# Patient Record
Sex: FEMALE | Race: WHITE | NOT HISPANIC OR LATINO | Employment: UNEMPLOYED | URBAN - METROPOLITAN AREA
[De-identification: names, ages, dates, MRNs, and addresses within clinical notes are randomized per-mention and may not be internally consistent; named-entity substitution may affect disease eponyms.]

---

## 2019-05-13 ENCOUNTER — HOSPITAL ENCOUNTER (EMERGENCY)
Facility: HOSPITAL | Age: 44
Discharge: HOME | End: 2019-05-13
Attending: EMERGENCY MEDICINE
Payer: COMMERCIAL

## 2019-05-13 VITALS
RESPIRATION RATE: 18 BRPM | TEMPERATURE: 98.7 F | HEART RATE: 60 BPM | OXYGEN SATURATION: 100 % | SYSTOLIC BLOOD PRESSURE: 148 MMHG | WEIGHT: 165 LBS | DIASTOLIC BLOOD PRESSURE: 57 MMHG

## 2019-05-13 DIAGNOSIS — H81.399 PERIPHERAL VERTIGO: Primary | ICD-10-CM

## 2019-05-13 PROCEDURE — 99283 EMERGENCY DEPT VISIT LOW MDM: CPT | Performed by: EMERGENCY MEDICINE

## 2019-05-13 PROCEDURE — 93005 ELECTROCARDIOGRAM TRACING: CPT

## 2019-05-13 PROCEDURE — 99284 EMERGENCY DEPT VISIT MOD MDM: CPT

## 2019-05-13 RX ORDER — MECLIZINE HCL 12.5 MG/1
25 TABLET ORAL ONCE
Status: COMPLETED | OUTPATIENT
Start: 2019-05-13 | End: 2019-05-13

## 2019-05-13 RX ORDER — MECLIZINE HYDROCHLORIDE 25 MG/1
25 TABLET ORAL EVERY 8 HOURS PRN
Qty: 30 TABLET | Refills: 0 | Status: SHIPPED | OUTPATIENT
Start: 2019-05-13

## 2019-05-13 RX ADMIN — MECLIZINE 25 MG: 12.5 TABLET ORAL at 17:40

## 2019-05-14 LAB
ATRIAL RATE: 0 BPM
ATRIAL RATE: 0 BPM
ATRIAL RATE: 60 BPM
P AXIS: 8 DEGREES
PR INTERVAL: 116 MS
QRS AXIS: 0 DEGREES
QRS AXIS: 0 DEGREES
QRS AXIS: 34 DEGREES
QRSD INTERVAL: 0 MS
QRSD INTERVAL: 0 MS
QRSD INTERVAL: 86 MS
QT INTERVAL: 0 MS
QT INTERVAL: 0 MS
QT INTERVAL: 416 MS
QTC INTERVAL: 0 MS
QTC INTERVAL: 0 MS
QTC INTERVAL: 416 MS
T WAVE AXIS: 0 DEGREES
T WAVE AXIS: 0 DEGREES
T WAVE AXIS: 35 DEGREES
VENTRICULAR RATE: 0 BPM
VENTRICULAR RATE: 0 BPM
VENTRICULAR RATE: 60 BPM

## 2019-05-14 PROCEDURE — 93010 ELECTROCARDIOGRAM REPORT: CPT | Performed by: INTERNAL MEDICINE

## 2019-12-15 ENCOUNTER — APPOINTMENT (EMERGENCY)
Dept: RADIOLOGY | Facility: HOSPITAL | Age: 44
End: 2019-12-15
Payer: COMMERCIAL

## 2019-12-15 ENCOUNTER — HOSPITAL ENCOUNTER (EMERGENCY)
Facility: HOSPITAL | Age: 44
Discharge: HOME | End: 2019-12-15
Attending: EMERGENCY MEDICINE
Payer: COMMERCIAL

## 2019-12-15 VITALS
RESPIRATION RATE: 18 BRPM | WEIGHT: 165 LBS | TEMPERATURE: 98 F | OXYGEN SATURATION: 99 % | HEART RATE: 72 BPM | DIASTOLIC BLOOD PRESSURE: 84 MMHG | SYSTOLIC BLOOD PRESSURE: 113 MMHG

## 2019-12-15 DIAGNOSIS — J02.9 PHARYNGITIS: Primary | ICD-10-CM

## 2019-12-15 PROCEDURE — 70360 X-RAY EXAM OF NECK: CPT

## 2019-12-15 PROCEDURE — 99283 EMERGENCY DEPT VISIT LOW MDM: CPT

## 2019-12-15 PROCEDURE — 99283 EMERGENCY DEPT VISIT LOW MDM: CPT | Performed by: EMERGENCY MEDICINE

## 2019-12-15 RX ADMIN — DEXAMETHASONE SODIUM PHOSPHATE 10 MG: 10 INJECTION, SOLUTION INTRAMUSCULAR; INTRAVENOUS at 13:24

## 2019-12-15 NOTE — ED PROVIDER NOTES
History  Chief Complaint   Patient presents with    Pain     pt has pain in throat X4 days, history of thyroid nodules     This is a 53 y o  old female who presents to the ED for evaluation of sore throat  Has 2 thyroid cysts  Has had 4 days of pain with swallowing, no fish  Has a sensation like something is stuck  No cough, congestion runny nose  Has not taken any medications to help  This its related to the thyroid  Otherwise, patient denies fevers, chills, night sweats, cough, congestion, rhinorrhea, CP, dyspnea, abdominal pain, nausea, vomiting, diarrhea, constipation, urinary symptoms, leg pain or swelling  Prior to Admission Medications   Prescriptions Last Dose Informant Patient Reported? Taking?   meclizine (ANTIVERT) 25 mg tablet   No No   Sig: Take 1 tablet (25 mg total) by mouth every 8 (eight) hours as needed for dizziness      Facility-Administered Medications: None     Past Medical History:   Diagnosis Date    Disease of thyroid gland      History reviewed  No pertinent surgical history  History reviewed  No pertinent family history  I have reviewed and agree with the history as documented  Social History     Tobacco Use    Smoking status: Never Smoker    Smokeless tobacco: Never Used   Substance Use Topics    Alcohol use: Never     Frequency: Never    Drug use: Never      Review of Systems   Constitutional: Negative for chills, fatigue, fever and unexpected weight change  HENT: Negative for congestion, rhinorrhea and sore throat  Eyes: Negative for redness and visual disturbance  Respiratory: Positive for shortness of breath  Negative for cough  Cardiovascular: Negative for chest pain and leg swelling  Gastrointestinal: Negative for abdominal pain, constipation, diarrhea, nausea and vomiting  Endocrine: Negative for cold intolerance and heat intolerance  Genitourinary: Negative for dysuria, frequency and urgency  Musculoskeletal: Negative for back pain     Skin: Negative for rash  Neurological: Negative for dizziness, syncope and numbness  All other systems reviewed and are negative  Physical Exam  Physical Exam   Constitutional: She is oriented to person, place, and time  She appears well-developed and well-nourished  No distress  HENT:   Head: Normocephalic and atraumatic  Nose: Nose normal    Mouth/Throat: Uvula is midline and mucous membranes are normal  No oropharyngeal exudate, posterior oropharyngeal edema or posterior oropharyngeal erythema  Eyes: Pupils are equal, round, and reactive to light  Conjunctivae and EOM are normal    Neck: Normal range of motion  Neck supple  Tightness in anterior neck with ROM  Cardiovascular: Normal rate, regular rhythm and normal heart sounds  Exam reveals no gallop  No murmur heard  Pulmonary/Chest: Effort normal and breath sounds normal  She has no wheezes  She exhibits no tenderness  Abdominal: Soft  Bowel sounds are normal  She exhibits no distension  There is no tenderness  There is no rebound and no guarding  Musculoskeletal: Normal range of motion  She exhibits no tenderness or deformity  Lymphadenopathy:        Head (right side): No submental, no submandibular, no tonsillar and no occipital adenopathy present  Head (left side): No submental, no submandibular, no tonsillar and no occipital adenopathy present  She has no cervical adenopathy  Right cervical: No superficial cervical, no deep cervical and no posterior cervical adenopathy present  Left cervical: No superficial cervical, no deep cervical and no posterior cervical adenopathy present  Neurological: She is alert and oriented to person, place, and time  No cranial nerve deficit  Skin: Skin is warm and dry  No rash noted  She is not diaphoretic  No erythema  Psychiatric: She has a normal mood and affect  Nursing note and vitals reviewed      Vital Signs  ED Triage Vitals [12/15/19 1223]   Temperature Pulse Respirations Blood Pressure SpO2   98 °F (36 7 °C) 72 18 113/84 99 %      Temp Source Heart Rate Source Patient Position - Orthostatic VS BP Location FiO2 (%)   Oral -- -- Left arm --      Pain Score       8         ED Medications  Medications   dexamethasone 10 mg/mL oral liquid 10 mg 1 mL (10 mg Oral Given 12/15/19 1324)     Diagnostic Studies  Results Reviewed     None         XR neck soft tissue   ED Interpretation by Jasvir Hansen MD (12/15 1336)   No evidence of acute abnormality, as interpreted by me         Procedures  Procedures     ED Course       A/P: This is a 53 y o  female who presents to the ED for evaluation of sore throat  No celar etiology on exam  Soft tissue neck xray  Dec[facility]  [address] [facility] negative  Suspect pharyngitis  Symptomatic management  I personally discussed return precautions with this patient and family  I provided the patient with written discharge instructions and particularly highlighted specific areas of interest to this patient, including but not limited to: medications for symptom managment, follow up recommendations, and return precautions  Patient and family are in agreement with this plan as outlined above  MDM    Disposition  Final diagnoses:   Pharyngitis     Time reflects when diagnosis was documented in both MDM as applicable and the Disposition within this note     Time User Action Codes Description Comment    12/15/2019  1:43 PM Chikis Bautista Add [J02 9] Pharyngitis       ED Disposition     ED Disposition Condition Date/Time Comment    Discharge Stable Sun Dec 15, 2019  1:43 PM Kristina Knig discharge to home/self care              Follow-up Information     Follow up With Specialties Details Why Contact Info Additional [id number] Internal Medicine Call in 3 days For reevaluation as needed [address] [facility]  [phone]       [facility] Emergency Medicine Go to  If symptoms worsen [address] [facility] [address] [id number]  [phone] AN ED, [address], [place], [place], [address]          Discharge Medication List as of 12/15/2019  1:55 PM      CONTINUE these medications which have NOT CHANGED    Details   meclizine (ANTIVERT) 25 mg tablet Take 1 tablet (25 mg total) by mouth every 8 (eight) hours as needed for dizziness, Starting Mon 5/13/2019, Print           No discharge procedures on file      ED Provider  Electronically Signed by           Tricia Mayorga MD  12/15/19 0687

## 2021-04-23 ENCOUNTER — IMMUNIZATIONS (OUTPATIENT)
Dept: FAMILY MEDICINE CLINIC | Facility: HOSPITAL | Age: 46
End: 2021-04-23

## 2021-04-23 DIAGNOSIS — Z23 ENCOUNTER FOR IMMUNIZATION: Primary | ICD-10-CM

## 2021-04-23 PROCEDURE — 91300 SARS-COV-2 / COVID-19 MRNA VACCINE (PFIZER-BIONTECH) 30 MCG: CPT

## 2021-04-23 PROCEDURE — 0001A SARS-COV-2 / COVID-19 MRNA VACCINE (PFIZER-BIONTECH) 30 MCG: CPT

## 2021-05-15 ENCOUNTER — IMMUNIZATIONS (OUTPATIENT)
Dept: FAMILY MEDICINE CLINIC | Facility: HOSPITAL | Age: 46
End: 2021-05-15

## 2021-05-15 DIAGNOSIS — Z23 ENCOUNTER FOR IMMUNIZATION: Primary | ICD-10-CM

## 2021-05-15 PROCEDURE — 0002A SARS-COV-2 / COVID-19 MRNA VACCINE (PFIZER-BIONTECH) 30 MCG: CPT

## 2021-05-15 PROCEDURE — 91300 SARS-COV-2 / COVID-19 MRNA VACCINE (PFIZER-BIONTECH) 30 MCG: CPT

## 2021-10-27 ENCOUNTER — HOSPITAL ENCOUNTER (OUTPATIENT)
Dept: ULTRASOUND IMAGING | Facility: HOSPITAL | Age: 46
Discharge: HOME | End: 2021-10-27
Payer: COMMERCIAL

## 2021-10-27 DIAGNOSIS — E04.1 THYROID CYST: ICD-10-CM

## 2021-10-27 PROCEDURE — 76536 US EXAM OF HEAD AND NECK: CPT

## 2021-12-08 ENCOUNTER — HOSPITAL ENCOUNTER (EMERGENCY)
Facility: HOSPITAL | Age: 46
Discharge: HOME | End: 2021-12-09
Attending: EMERGENCY MEDICINE | Admitting: EMERGENCY MEDICINE
Payer: COMMERCIAL

## 2021-12-08 ENCOUNTER — APPOINTMENT (EMERGENCY)
Dept: RADIOLOGY | Facility: HOSPITAL | Age: 46
End: 2021-12-08
Payer: COMMERCIAL

## 2021-12-08 VITALS
SYSTOLIC BLOOD PRESSURE: 173 MMHG | OXYGEN SATURATION: 100 % | RESPIRATION RATE: 18 BRPM | HEART RATE: 64 BPM | WEIGHT: 165 LBS | TEMPERATURE: 98.1 F | DIASTOLIC BLOOD PRESSURE: 91 MMHG

## 2021-12-08 DIAGNOSIS — N39.0 UTI (URINARY TRACT INFECTION): Primary | ICD-10-CM

## 2021-12-08 DIAGNOSIS — F41.9 ANXIETY: ICD-10-CM

## 2021-12-08 DIAGNOSIS — E05.90 HYPERTHYROIDISM: ICD-10-CM

## 2021-12-08 LAB
ALBUMIN SERPL BCP-MCNC: 3.5 G/DL (ref 3.5–5)
ALP SERPL-CCNC: 82 U/L (ref 46–116)
ALT SERPL W P-5'-P-CCNC: 31 U/L (ref 12–78)
ANION GAP SERPL CALCULATED.3IONS-SCNC: 8 MMOL/L (ref 4–13)
AST SERPL W P-5'-P-CCNC: 17 U/L (ref 5–45)
ATRIAL RATE: 71 BPM
BACTERIA UR QL AUTO: ABNORMAL /HPF
BASOPHILS # BLD AUTO: 0.03 THOUSANDS/ΜL (ref 0–0.1)
BASOPHILS NFR BLD AUTO: 0 % (ref 0–1)
BILIRUB SERPL-MCNC: 0.24 MG/DL (ref 0.2–1)
BILIRUB UR QL STRIP: NEGATIVE
BUN SERPL-MCNC: 16 MG/DL (ref 5–25)
CALCIUM SERPL-MCNC: 8.4 MG/DL (ref 8.3–10.1)
CARDIAC TROPONIN I PNL SERPL HS: <2 NG/L
CHLORIDE SERPL-SCNC: 102 MMOL/L (ref 100–108)
CLARITY UR: CLEAR
CO2 SERPL-SCNC: 25 MMOL/L (ref 21–32)
COLOR UR: YELLOW
CREAT SERPL-MCNC: 0.77 MG/DL (ref 0.6–1.3)
EOSINOPHIL # BLD AUTO: 0.14 THOUSAND/ΜL (ref 0–0.61)
EOSINOPHIL NFR BLD AUTO: 2 % (ref 0–6)
ERYTHROCYTE [DISTWIDTH] IN BLOOD BY AUTOMATED COUNT: 16 % (ref 11.6–15.1)
EXT PREG TEST URINE: NORMAL
EXT. CONTROL ED NAV: NORMAL
FLUAV RNA RESP QL NAA+PROBE: NEGATIVE
FLUBV RNA RESP QL NAA+PROBE: NEGATIVE
GFR SERPL CREATININE-BSD FRML MDRD: 92 ML/MIN/1.73SQ M
GLUCOSE SERPL-MCNC: 136 MG/DL (ref 65–140)
GLUCOSE UR STRIP-MCNC: NEGATIVE MG/DL
HCT VFR BLD AUTO: 37.4 % (ref 34.8–46.1)
HGB BLD-MCNC: 11.7 G/DL (ref 11.5–15.4)
HGB UR QL STRIP.AUTO: NEGATIVE
IMM GRANULOCYTES # BLD AUTO: 0.02 THOUSAND/UL (ref 0–0.2)
IMM GRANULOCYTES NFR BLD AUTO: 0 % (ref 0–2)
KETONES UR STRIP-MCNC: NEGATIVE MG/DL
LEUKOCYTE ESTERASE UR QL STRIP: ABNORMAL
LYMPHOCYTES # BLD AUTO: 1.89 THOUSANDS/ΜL (ref 0.6–4.47)
LYMPHOCYTES NFR BLD AUTO: 27 % (ref 14–44)
MCH RBC QN AUTO: 24.5 PG (ref 26.8–34.3)
MCHC RBC AUTO-ENTMCNC: 31.3 G/DL (ref 31.4–37.4)
MCV RBC AUTO: 78 FL (ref 82–98)
MONOCYTES # BLD AUTO: 0.38 THOUSAND/ΜL (ref 0.17–1.22)
MONOCYTES NFR BLD AUTO: 5 % (ref 4–12)
MUCOUS THREADS UR QL AUTO: ABNORMAL
NEUTROPHILS # BLD AUTO: 4.59 THOUSANDS/ΜL (ref 1.85–7.62)
NEUTS SEG NFR BLD AUTO: 66 % (ref 43–75)
NITRITE UR QL STRIP: NEGATIVE
NON-SQ EPI CELLS URNS QL MICRO: ABNORMAL /HPF
NRBC BLD AUTO-RTO: 0 /100 WBCS
P AXIS: 3 DEGREES
PH UR STRIP.AUTO: 5.5 [PH] (ref 4.5–8)
PLATELET # BLD AUTO: 324 THOUSANDS/UL (ref 149–390)
PMV BLD AUTO: 9.7 FL (ref 8.9–12.7)
POTASSIUM SERPL-SCNC: 4 MMOL/L (ref 3.5–5.3)
PR INTERVAL: 110 MS
PROT SERPL-MCNC: 7.9 G/DL (ref 6.4–8.2)
PROT UR STRIP-MCNC: NEGATIVE MG/DL
QRS AXIS: 47 DEGREES
QRSD INTERVAL: 70 MS
QT INTERVAL: 382 MS
QTC INTERVAL: 407 MS
RBC # BLD AUTO: 4.78 MILLION/UL (ref 3.81–5.12)
RBC #/AREA URNS AUTO: ABNORMAL /HPF
RSV RNA RESP QL NAA+PROBE: NEGATIVE
SARS-COV-2 RNA RESP QL NAA+PROBE: NEGATIVE
SODIUM SERPL-SCNC: 135 MMOL/L (ref 136–145)
SP GR UR STRIP.AUTO: 1.02 (ref 1–1.03)
T WAVE AXIS: 28 DEGREES
TSH SERPL DL<=0.05 MIU/L-ACNC: 0.26 UIU/ML (ref 0.36–3.74)
UROBILINOGEN UR QL STRIP.AUTO: 0.2 E.U./DL
VENTRICULAR RATE: 68 BPM
WBC # BLD AUTO: 7.05 THOUSAND/UL (ref 4.31–10.16)
WBC #/AREA URNS AUTO: ABNORMAL /HPF

## 2021-12-08 PROCEDURE — 85025 COMPLETE CBC W/AUTO DIFF WBC: CPT | Performed by: EMERGENCY MEDICINE

## 2021-12-08 PROCEDURE — 93010 ELECTROCARDIOGRAM REPORT: CPT | Performed by: INTERNAL MEDICINE

## 2021-12-08 PROCEDURE — 71045 X-RAY EXAM CHEST 1 VIEW: CPT

## 2021-12-08 PROCEDURE — 36415 COLL VENOUS BLD VENIPUNCTURE: CPT

## 2021-12-08 PROCEDURE — 99284 EMERGENCY DEPT VISIT MOD MDM: CPT | Performed by: EMERGENCY MEDICINE

## 2021-12-08 PROCEDURE — 84443 ASSAY THYROID STIM HORMONE: CPT | Performed by: EMERGENCY MEDICINE

## 2021-12-08 PROCEDURE — 84439 ASSAY OF FREE THYROXINE: CPT | Performed by: EMERGENCY MEDICINE

## 2021-12-08 PROCEDURE — 0241U HB NFCT DS VIR RESP RNA 4 TRGT: CPT | Performed by: EMERGENCY MEDICINE

## 2021-12-08 PROCEDURE — 81025 URINE PREGNANCY TEST: CPT | Performed by: EMERGENCY MEDICINE

## 2021-12-08 PROCEDURE — 81001 URINALYSIS AUTO W/SCOPE: CPT

## 2021-12-08 PROCEDURE — 84484 ASSAY OF TROPONIN QUANT: CPT | Performed by: EMERGENCY MEDICINE

## 2021-12-08 PROCEDURE — 99285 EMERGENCY DEPT VISIT HI MDM: CPT

## 2021-12-08 PROCEDURE — 96360 HYDRATION IV INFUSION INIT: CPT

## 2021-12-08 PROCEDURE — 80053 COMPREHEN METABOLIC PANEL: CPT | Performed by: EMERGENCY MEDICINE

## 2021-12-08 PROCEDURE — 93005 ELECTROCARDIOGRAM TRACING: CPT

## 2021-12-08 RX ORDER — HYDROXYZINE HYDROCHLORIDE 25 MG/1
25 TABLET, FILM COATED ORAL EVERY 6 HOURS PRN
Qty: 12 TABLET | Refills: 0 | Status: SHIPPED | OUTPATIENT
Start: 2021-12-08

## 2021-12-08 RX ORDER — CEPHALEXIN 250 MG/1
500 CAPSULE ORAL ONCE
Status: COMPLETED | OUTPATIENT
Start: 2021-12-08 | End: 2021-12-08

## 2021-12-08 RX ORDER — CEPHALEXIN 500 MG/1
500 CAPSULE ORAL EVERY 12 HOURS SCHEDULED
Qty: 10 CAPSULE | Refills: 0 | Status: SHIPPED | OUTPATIENT
Start: 2021-12-08 | End: 2021-12-13

## 2021-12-08 RX ORDER — LORAZEPAM 0.5 MG/1
0.5 TABLET ORAL ONCE
Status: COMPLETED | OUTPATIENT
Start: 2021-12-08 | End: 2021-12-08

## 2021-12-08 RX ADMIN — CEPHALEXIN 500 MG: 250 CAPSULE ORAL at 23:57

## 2021-12-08 RX ADMIN — LORAZEPAM 0.5 MG: 0.5 TABLET ORAL at 22:13

## 2021-12-08 RX ADMIN — SODIUM CHLORIDE 1000 ML: 0.9 INJECTION, SOLUTION INTRAVENOUS at 22:14

## 2021-12-09 LAB — T4 FREE SERPL-MCNC: 1.12 NG/DL (ref 0.76–1.46)

## 2022-02-28 ENCOUNTER — HOSPITAL ENCOUNTER (OUTPATIENT)
Dept: MAMMOGRAPHY | Facility: HOSPITAL | Age: 47
Discharge: HOME | End: 2022-02-28
Payer: COMMERCIAL

## 2022-02-28 VITALS — BODY MASS INDEX: 32.38 KG/M2 | WEIGHT: 164.9 LBS | HEIGHT: 60 IN

## 2022-02-28 DIAGNOSIS — Z12.31 ENCOUNTER FOR SCREENING MAMMOGRAM FOR MALIGNANT NEOPLASM OF BREAST: ICD-10-CM

## 2022-02-28 PROCEDURE — 77063 BREAST TOMOSYNTHESIS BI: CPT

## 2022-02-28 PROCEDURE — 77067 SCR MAMMO BI INCL CAD: CPT

## 2022-04-27 ENCOUNTER — OFFICE VISIT (OUTPATIENT)
Dept: ENDOCRINOLOGY | Facility: CLINIC | Age: 47
End: 2022-04-27
Payer: COMMERCIAL

## 2022-04-27 VITALS
BODY MASS INDEX: 33.9 KG/M2 | DIASTOLIC BLOOD PRESSURE: 80 MMHG | SYSTOLIC BLOOD PRESSURE: 128 MMHG | HEART RATE: 80 BPM | TEMPERATURE: 97.5 F | WEIGHT: 173.6 LBS

## 2022-04-27 DIAGNOSIS — E05.90 HYPERTHYROIDISM: Primary | ICD-10-CM

## 2022-04-27 DIAGNOSIS — R94.6 ABNORMAL THYROID FUNCTION TEST: ICD-10-CM

## 2022-04-27 DIAGNOSIS — G44.229 CHRONIC TENSION-TYPE HEADACHE, NOT INTRACTABLE: ICD-10-CM

## 2022-04-27 DIAGNOSIS — E55.9 VITAMIN D DEFICIENCY: ICD-10-CM

## 2022-04-27 PROCEDURE — 99204 OFFICE O/P NEW MOD 45 MIN: CPT | Performed by: INTERNAL MEDICINE

## 2022-04-27 RX ORDER — PANTOPRAZOLE SODIUM 40 MG/1
TABLET, DELAYED RELEASE ORAL
COMMUNITY
Start: 2022-03-03

## 2022-04-27 NOTE — PROGRESS NOTES
[facility] 50 y o  female MRN: [id number]    Encounter: [id number]      Assessment/Plan     Assessment: This is a 50y o -year-old female with hyperthyroidism    Plan:    Diagnoses and all orders for this visit:    Hyperthyroidism    Lab Results   Component Value Date    LCX4OQLMLFMH 0 259 (L) 12/08/2021   Last TSH is 0 2, from December 2021, free T4 was 1 12  TSH improved in December 2021, which suggest that patient had thyroiditis in November  TSH was suppressed in November 2021  Based on her symptoms and exam, she may have developed hypothyroidism  Will obtain thyroid function test, TSH free T4 to assess thyroid status  If it is indicated a of hypothyroidism she will need levothyroxine replacement  Discussed with daughter  Will obtain following blood work  -     T4, free; Future  -     TSH, 3rd generation; Future  -     Thyroid stimulating immunoglobulin; Future  -     Thyroid Antibodies Panel; Future  -     Thyrotropin receptor antibody; Future  -     Ambulatory Referral to Internal Medicine; Future  -     T4, free; Future  -     TSH, 3rd generation; Future  -     Vitamin D 25 hydroxy; Future    Vitamin D deficiency  Discussed to start vitamin-D 3 supplementation 2000 International Units daily    Abnormal thyroid function test  Repeat thyroid function test now, to assess thyroid status  -     Ambulatory Referral to Internal Medicine; Future  -     T4, free; Future  -     TSH, 3rd generation; Future  -     Vitamin D 25 hydroxy; Future    Chronic tension-type headache, not intractable  Patient has been having chronic headaches on regular basis along with dizziness, will refer to Neurology  Also discussed that she should make appointment with primary care physician for palpitation, specially if the thyroid profile is within normal range, to rule out other causes such as anxiety disorder, cardiac causes  -     Ambulatory referral to Neurology;  Future        CC:   Hyperthyroidism    History of Present Illness     HPI:  Megan Bound 52-year-old woman with medical history of hyperthyroidism, diagnosed based on the blood work in November 2020, vitamin-D deficiency is here for evaluation of abnormal thyroid function test     Patient is only [place] speaking, she prefers her daughter Juan Most to interpret for her  I had offered her option of thyroid com , but she declined  Patient gives history of weight gain in the last 5 months, complains of palpitations, anxiety and nervousness not able to sleep at night  She denies taking over-the-counter herbal supplementation, Biotene supplementation  She complains of headaches along with dizziness off and on was prescribed Antivert which she is not taking it currently  She also has vitamin-D deficiency, currently not taking any vitamin-D 3 supplementation  Her TSH was 0 05, free T4 by equilibrium dialysis was 1 9, normal range is 0 8-1 7, vitamin-D was 23 7, vitamin B12 is 484    Thyroid ultrasound showed thyroid parenchyma is diffusely heterogeneous in echo structure and is mildly hyperemic  Right lobe is 6 7 x 2 2 x 1 4 cm, left lobe is 6 2 x 1 5 x 1 6 cm  No thyroid nodules or calcifications  No discrete thyroid nodule  Diffusely heterogeneous and mildly hyperemic thyroid firing,  Exclude thyroiditis  Thyroid ultrasound was performed in March 2020  She denies family history of thyroid cancer, thyroid problems  Review of Systems   Constitutional: Positive for activity change and fatigue  HENT: Negative  Eyes: Negative  Respiratory: Negative  Cardiovascular: Positive for palpitations  Gastrointestinal: Negative  Endocrine: Negative for cold intolerance and heat intolerance  Genitourinary: Negative  Musculoskeletal: Positive for arthralgias, back pain and myalgias  Allergic/Immunologic: Negative  Neurological: Positive for dizziness and headaches  Hematological: Negative      Psychiatric/Behavioral: The patient is nervous/anxious  Historical Information   Past Medical History:   Diagnosis Date    Disease of thyroid gland      History reviewed  No pertinent surgical history  Social History   Social History     Substance and Sexual Activity   Alcohol Use Never     Social History     Substance and Sexual Activity   Drug Use Never     Social History     Tobacco Use   Smoking Status Never Smoker   Smokeless Tobacco Never Used     Family History: History reviewed  No pertinent family history  Meds/Allergies   Current Outpatient Medications   Medication Sig Dispense Refill    hydrOXYzine HCL (ATARAX) 25 mg tablet Take 1 tablet (25 mg total) by mouth every 6 (six) hours as needed for anxiety 12 tablet 0    meclizine (ANTIVERT) 25 mg tablet Take 1 tablet (25 mg total) by mouth every 8 (eight) hours as needed for dizziness 30 tablet 0    pantoprazole (PROTONIX) 40 mg tablet        No current facility-administered medications for this visit  No Known Allergies    Objective   Vitals: Blood pressure 128/80, pulse 80, temperature 97 5 °F (36 4 °C), weight 78 7 kg (173 lb 9 6 oz)  Physical Exam  Vitals reviewed  Constitutional:       General: She is not in acute distress  Appearance: Normal appearance  She is not ill-appearing  HENT:      Head: Normocephalic and atraumatic  Nose: Nose normal       Mouth/Throat:      Mouth: Mucous membranes are moist    Eyes:      Extraocular Movements: Extraocular movements intact  Conjunctiva/sclera: Conjunctivae normal    Cardiovascular:      Rate and Rhythm: Normal rate and regular rhythm  Pulses: Normal pulses  Heart sounds: Normal heart sounds  Pulmonary:      Effort: Pulmonary effort is normal  No respiratory distress  Breath sounds: Normal breath sounds  Abdominal:      General: Bowel sounds are normal       Palpations: Abdomen is soft  Musculoskeletal:         General: Normal range of motion        Cervical back: Normal range of motion and neck supple  Right lower leg: No edema  Left lower leg: No edema  Lymphadenopathy:      Cervical: No cervical adenopathy  Skin:     General: Skin is warm and dry  Neurological:      General: No focal deficit present  Mental Status: She is alert and oriented to person, place, and time  Psychiatric:         Mood and Affect: Mood normal          Behavior: Behavior normal          The history was obtained from the review of the chart, patient  Lab Results:   Lab Results   Component Value Date/Time    TSH 3RD GENERATON 0 259 (L) 12/08/2021 10:01 PM    Free T4 1 12 12/08/2021 10:01 PM       Imaging Studies:   Results for orders placed during the hospital encounter of 10/27/21    US thyroid    Impression  No discrete thyroid nodule  Diffusely heterogeneous and mildly hyperemic thyroid parenchyma  Correlate with thyroid function tests to exclude thyroiditis  Reference: ACR Thyroid Imaging, Reporting and Data System (TI-RADS): White Paper of the [employer]  J AM Luisa Radiol 6384;32:769-806  (additional recommendations based on American Thyroid Association 2015 guidelines )      Workstation performed: [id number]      I have personally reviewed pertinent reports  Portions of the record may have been created with voice recognition software  Occasional wrong word or "sound a like" substitutions may have occurred due to the inherent limitations of voice recognition software  Read the chart carefully and recognize, using context, where substitutions have occurred

## 2022-05-06 ENCOUNTER — TELEPHONE (OUTPATIENT)
Dept: ENDOCRINOLOGY | Facility: CLINIC | Age: 47
End: 2022-05-06

## 2022-05-06 NOTE — TELEPHONE ENCOUNTER
Please inform pt Thyroid blood work is normal, Vitamin D is low , start vitamin D 3, 2000 IU daily,  Will repeat TSH and free t4 in 6 weeks     Call can be done on Monday   Calixto Vallejo MD

## 2022-05-08 LAB
25(OH)D3+25(OH)D2 SERPL-MCNC: 21.2 NG/ML (ref 30–100)
T4 FREE SERPL DIALY-MCNC: 1.2 NG/DL
TSH SERPL-ACNC: 3.3 UU/ML

## 2022-05-11 ENCOUNTER — TELEPHONE (OUTPATIENT)
Dept: ENDOCRINOLOGY | Facility: CLINIC | Age: 47
End: 2022-05-11

## 2022-05-11 NOTE — TELEPHONE ENCOUNTER
----- Message from Washington Matamoros MD sent at 5/11/2022  9:23 AM EDT -----  Please call the patient regarding her results, thyroid blood work is within normal range

## 2022-08-22 ENCOUNTER — CONSULT (OUTPATIENT)
Dept: GASTROENTEROLOGY | Facility: CLINIC | Age: 47
End: 2022-08-22
Payer: COMMERCIAL

## 2022-08-22 VITALS
TEMPERATURE: 98.7 F | HEIGHT: 60 IN | HEART RATE: 85 BPM | DIASTOLIC BLOOD PRESSURE: 81 MMHG | WEIGHT: 168 LBS | SYSTOLIC BLOOD PRESSURE: 114 MMHG | BODY MASS INDEX: 32.98 KG/M2

## 2022-08-22 DIAGNOSIS — K21.9 GASTROESOPHAGEAL REFLUX DISEASE, UNSPECIFIED WHETHER ESOPHAGITIS PRESENT: Primary | ICD-10-CM

## 2022-08-22 DIAGNOSIS — R11.2 NAUSEA AND VOMITING, UNSPECIFIED VOMITING TYPE: ICD-10-CM

## 2022-08-22 DIAGNOSIS — R68.81 EARLY SATIETY: ICD-10-CM

## 2022-08-22 PROCEDURE — 99203 OFFICE O/P NEW LOW 30 MIN: CPT | Performed by: PHYSICIAN ASSISTANT

## 2022-08-22 RX ORDER — OMEPRAZOLE 20 MG/1
20 CAPSULE, DELAYED RELEASE ORAL 2 TIMES DAILY
Qty: 180 CAPSULE | Refills: 3 | Status: SHIPPED | OUTPATIENT
Start: 2022-08-22

## 2022-08-22 RX ORDER — OMEPRAZOLE 20 MG/1
20 CAPSULE, DELAYED RELEASE ORAL 2 TIMES DAILY
Qty: 60 CAPSULE | Refills: 3 | Status: SHIPPED | OUTPATIENT
Start: 2022-08-22 | End: 2022-08-22 | Stop reason: SDUPTHER

## 2022-08-22 NOTE — PATIENT INSTRUCTIONS
Scheduled date of EGD(as of today): 9/14/22  Physician performing EGD: Dr Vinicio Lott  Location of EGD: [facility]   Instructions reviewed with patient by: Marita   Clearances:   n/a

## 2022-08-22 NOTE — H&P (VIEW-ONLY)
[facility] - Outpatient Consultation  Abrahan Perales 50 y o  female MRN: [id number]  Encounter: [id number]          ASSESSMENT AND PLAN:      1  GERD  2  Nausea/Vomiting  3  Early Satiety    Patient presents for evaluation of "gastritis" as previously diagnosed by a gastroenterologist in [place] with symptoms of epigastric pain, heartburn, regurgitation and post-prandial nausea and vomiting  She has tried and failed pantoprazole, Prevacid, Nexium and famotidine  Will trial her on omeprazole 20mg twice daily with the option to increase to 40mg BID if needed  If this is not effective, may consider a prior authorization for Dexilant, if on formulary  Will plan for EGD for additional evaluation for gastritis/PUD, H pylori and celiac disease  If this is normal and symptoms persist, would also consider GES as she has refractory GERD with symptoms of nausea and early satiety  Reviewed dietary recommendations for GERD in detail  All questions were answered and she is in agreement with this plan  She is up to date with colonoscopy from 7years ago and may be considered for colonoscopy screening again at age 46,  ______________________________________________________________________    HPI:  Nicky Alcazar is a 73-year-old female with a history of hypothyroidism who presents to the office to establish care  She had previously followed with a gastroenterologist in [place] but is looking for a local care provider  She states she has had GI complaints since childhood, even while living in her home country of [place]   She most recently had an EGD >1 year ago which she states showed "gastritis "  She has been on various medications over the years including Prevacid, Nexium, famotidine and most recently pantoprazole  She states the pantoprazole helps with the "acid feeling" in the stomach, but she has persistent symptoms of epigastric pain, heartburn, regurgitation, nausea and vomiting    She has also experienced early satiety which is followed by nausea and vomiting episodes post-prandially  She has vomiting episodes after most foods, though finds she can tolerate potatoes, white rice, and nneka crackers with milk  She has occasional symptoms of dysphagia but no odynophagia, otalgia, or hoarseness  Her weight has remained stable  She had a colonoscopy several years ago (she believes around 7-8 years) and was advised by her previous gastroenterologist to have screening repeated at age 46  She denies melena or hematochezia  The visit was conducted with translation support from Shavonne's daughter Corinne Vega  Translation services were offered but declined  REVIEW OF SYSTEMS:    CONSTITUTIONAL: Denies any fever, chills, rigors, and weight loss  HEENT: No earache or tinnitus  Denies hearing loss or visual disturbances  CARDIOVASCULAR: No chest pain or palpitations  RESPIRATORY: Denies any cough, hemoptysis, shortness of breath or dyspnea on exertion  GASTROINTESTINAL: As noted in the History of Present Illness  GENITOURINARY: No problems with urination  Denies any hematuria or dysuria  NEUROLOGIC: No dizziness or vertigo, denies headaches  MUSCULOSKELETAL: Denies any muscle or joint pain  SKIN: Denies skin rashes or itching  ENDOCRINE: Denies excessive thirst  Denies intolerance to heat or cold  PSYCHOSOCIAL: Denies depression or anxiety  Denies any recent memory loss  Historical Information   Past Medical History:   Diagnosis Date    Disease of thyroid gland      History reviewed  No pertinent surgical history  Social History   Social History     Substance and Sexual Activity   Alcohol Use Never     Social History     Substance and Sexual Activity   Drug Use Never     Social History     Tobacco Use   Smoking Status Never Smoker   Smokeless Tobacco Never Used     History reviewed  No pertinent family history      Meds/Allergies       Current Outpatient Medications:    omeprazole (PriLOSEC) 20 mg delayed release capsule    hydrOXYzine HCL (ATARAX) 25 mg tablet    meclizine (ANTIVERT) 25 mg tablet    No Known Allergies        Objective     Blood pressure 114/81, pulse 85, temperature 98 7 °F (37 1 °C), temperature source Tympanic, height 5' (1 524 m), weight 76 2 kg (168 lb)  Body mass index is 32 81 kg/m²  PHYSICAL EXAM:      General Appearance:   Alert, cooperative, no distress   HEENT:   Normocephalic, atraumatic, anicteric      Neck:  Supple, symmetrical, trachea midline   Lungs:   Clear to auscultation bilaterally; no rales, rhonchi or wheezing; respirations unlabored    Heart[de-identified]   Regular rate and rhythm; no murmur, rub, or gallop     Abdomen:   Soft, non-tender, non-distended; normal bowel sounds; no masses, no organomegaly    Genitalia:   Deferred    Rectal:   Deferred    Extremities:  No cyanosis, clubbing or edema    Pulses:  2+ and symmetric    Skin:  No jaundice, rashes, or lesions          Dante Harris PA-C

## 2022-08-22 NOTE — PROGRESS NOTES
[facility] - Outpatient Consultation  Susan Salvador 50 y o  female MRN: [id number]  Encounter: [id number]          ASSESSMENT AND PLAN:      1  GERD  2  Nausea/Vomiting  3  Early Satiety    Patient presents for evaluation of "gastritis" as previously diagnosed by a gastroenterologist in [place] with symptoms of epigastric pain, heartburn, regurgitation and post-prandial nausea and vomiting  She has tried and failed pantoprazole, Prevacid, Nexium and famotidine  Will trial her on omeprazole 20mg twice daily with the option to increase to 40mg BID if needed  If this is not effective, may consider a prior authorization for Dexilant, if on formulary  Will plan for EGD for additional evaluation for gastritis/PUD, H pylori and celiac disease  If this is normal and symptoms persist, would also consider GES as she has refractory GERD with symptoms of nausea and early satiety  Reviewed dietary recommendations for GERD in detail  All questions were answered and she is in agreement with this plan  She is up to date with colonoscopy from 7years ago and may be considered for colonoscopy screening again at age 46,  ______________________________________________________________________    HPI:  Nidia Garcia is a 78-year-old female with a history of hypothyroidism who presents to the office to establish care  She had previously followed with a gastroenterologist in [place] but is looking for a local care provider  She states she has had GI complaints since childhood, even while living in her home country of [place]   She most recently had an EGD >1 year ago which she states showed "gastritis "  She has been on various medications over the years including Prevacid, Nexium, famotidine and most recently pantoprazole  She states the pantoprazole helps with the "acid feeling" in the stomach, but she has persistent symptoms of epigastric pain, heartburn, regurgitation, nausea and vomiting    She has also experienced early satiety which is followed by nausea and vomiting episodes post-prandially  She has vomiting episodes after most foods, though finds she can tolerate potatoes, white rice, and nneka crackers with milk  She has occasional symptoms of dysphagia but no odynophagia, otalgia, or hoarseness  Her weight has remained stable  She had a colonoscopy several years ago (she believes around 7-8 years) and was advised by her previous gastroenterologist to have screening repeated at age 46  She denies melena or hematochezia  The visit was conducted with translation support from Shavonne's daughter Corinne Vega  Translation services were offered but declined  REVIEW OF SYSTEMS:    CONSTITUTIONAL: Denies any fever, chills, rigors, and weight loss  HEENT: No earache or tinnitus  Denies hearing loss or visual disturbances  CARDIOVASCULAR: No chest pain or palpitations  RESPIRATORY: Denies any cough, hemoptysis, shortness of breath or dyspnea on exertion  GASTROINTESTINAL: As noted in the History of Present Illness  GENITOURINARY: No problems with urination  Denies any hematuria or dysuria  NEUROLOGIC: No dizziness or vertigo, denies headaches  MUSCULOSKELETAL: Denies any muscle or joint pain  SKIN: Denies skin rashes or itching  ENDOCRINE: Denies excessive thirst  Denies intolerance to heat or cold  PSYCHOSOCIAL: Denies depression or anxiety  Denies any recent memory loss  Historical Information   Past Medical History:   Diagnosis Date    Disease of thyroid gland      History reviewed  No pertinent surgical history  Social History   Social History     Substance and Sexual Activity   Alcohol Use Never     Social History     Substance and Sexual Activity   Drug Use Never     Social History     Tobacco Use   Smoking Status Never Smoker   Smokeless Tobacco Never Used     History reviewed  No pertinent family history      Meds/Allergies       Current Outpatient Medications:    omeprazole (PriLOSEC) 20 mg delayed release capsule    hydrOXYzine HCL (ATARAX) 25 mg tablet    meclizine (ANTIVERT) 25 mg tablet    No Known Allergies        Objective     Blood pressure 114/81, pulse 85, temperature 98 7 °F (37 1 °C), temperature source Tympanic, height 5' (1 524 m), weight 76 2 kg (168 lb)  Body mass index is 32 81 kg/m²  PHYSICAL EXAM:      General Appearance:   Alert, cooperative, no distress   HEENT:   Normocephalic, atraumatic, anicteric      Neck:  Supple, symmetrical, trachea midline   Lungs:   Clear to auscultation bilaterally; no rales, rhonchi or wheezing; respirations unlabored    Heart[de-identified]   Regular rate and rhythm; no murmur, rub, or gallop     Abdomen:   Soft, non-tender, non-distended; normal bowel sounds; no masses, no organomegaly    Genitalia:   Deferred    Rectal:   Deferred    Extremities:  No cyanosis, clubbing or edema    Pulses:  2+ and symmetric    Skin:  No jaundice, rashes, or lesions          Soha Longoria PA-C

## 2022-08-31 ENCOUNTER — OFFICE VISIT (OUTPATIENT)
Dept: ENDOCRINOLOGY | Facility: CLINIC | Age: 47
End: 2022-08-31
Payer: COMMERCIAL

## 2022-08-31 VITALS
SYSTOLIC BLOOD PRESSURE: 120 MMHG | WEIGHT: 171 LBS | BODY MASS INDEX: 33.57 KG/M2 | TEMPERATURE: 97.2 F | HEIGHT: 60 IN | DIASTOLIC BLOOD PRESSURE: 80 MMHG

## 2022-08-31 DIAGNOSIS — E55.9 VITAMIN D DEFICIENCY: ICD-10-CM

## 2022-08-31 DIAGNOSIS — E05.90 HYPERTHYROIDISM: Primary | ICD-10-CM

## 2022-08-31 PROCEDURE — 99214 OFFICE O/P EST MOD 30 MIN: CPT | Performed by: PHYSICIAN ASSISTANT

## 2022-08-31 RX ORDER — ACETAMINOPHEN 160 MG
2000 TABLET,DISINTEGRATING ORAL DAILY
Start: 2022-08-31

## 2022-08-31 NOTE — PROGRESS NOTES
Patient Progress Note    CC: hyperthyroidism   Patient is here with her daughter who will help with translation     Referring Provider  No referring provider defined for this encounter  History of Present Illness:     Patient is a 78-year-old female here for follow-up of hyperthyroidism and vitamin-D deficiency  In December 2021 she was noted to have low TSH is 0 259 and free T4 1  12   TSI, thyroid troponin receptor antibody and thyroid antibodies panel were previously ordered but not completed  Repeat TSH in April 2022 was normal at 3 3 and free T4 was normal at 1 2  She is not on thyroid medication at this time  She does have a sister who has thyroid disorder  No history of external radiation to head/neck/chest   No family history of thyroid cancer  No recent Iodine loading in form of medication, biotin or kelp supplements or radiological diagnostic studies  Thyroid ultrasound done in October 2021 did not show nodules  Vitamin-D was low at 21 2  She has not been taking vitamin D3 2000 International Units recently  Patient Active Problem List   Diagnosis    Hyperthyroidism    Vitamin D deficiency     Past Medical History:   Diagnosis Date    Disease of thyroid gland       History reviewed  No pertinent surgical history  History reviewed  No pertinent family history    Social History     Tobacco Use    Smoking status: Never Smoker    Smokeless tobacco: Never Used   Substance Use Topics    Alcohol use: Never     No Known Allergies  Current Outpatient Medications   Medication Sig Dispense Refill    Cholecalciferol (Vitamin D3) 50 MCG (2000 UT) capsule Take 1 capsule (2,000 Units total) by mouth daily      omeprazole (PriLOSEC) 20 mg delayed release capsule Take 1 capsule (20 mg total) by mouth 2 (two) times a day 180 capsule 3    hydrOXYzine HCL (ATARAX) 25 mg tablet Take 1 tablet (25 mg total) by mouth every 6 (six) hours as needed for anxiety (Patient not taking: No sig reported) 12 tablet 0    meclizine (ANTIVERT) 25 mg tablet Take 1 tablet (25 mg total) by mouth every 8 (eight) hours as needed for dizziness (Patient not taking: No sig reported) 30 tablet 0     No current facility-administered medications for this visit  Review of Systems   Constitutional: Positive for fatigue (insomnia)  Negative for activity change, appetite change and unexpected weight change  HENT: Negative for trouble swallowing  Eyes: Negative for visual disturbance  Respiratory: Negative for shortness of breath  Cardiovascular: Positive for palpitations  Negative for chest pain  Gastrointestinal: Negative for constipation and diarrhea  Endocrine: Negative for cold intolerance and heat intolerance  Musculoskeletal: Negative  Skin: Negative  Neurological: Negative for tremors  Psychiatric/Behavioral: The patient is nervous/anxious  Physical Exam:  Body mass index is 33 4 kg/m²  /80   Temp (!) 97 2 °F (36 2 °C) (Tympanic)   Ht 5' (1 524 m)   Wt 77 6 kg (171 lb)   BMI 33 40 kg/m²    Wt Readings from Last 3 Encounters:   08/31/22 77 6 kg (171 lb)   08/22/22 76 2 kg (168 lb)   04/27/22 78 7 kg (173 lb 9 6 oz)       Physical Exam  Vitals and nursing note reviewed  Constitutional:       Appearance: She is well-developed  HENT:      Head: Normocephalic  Eyes:      General: No scleral icterus  Pupils: Pupils are equal, round, and reactive to light  Neck:      Thyroid: No thyromegaly  Cardiovascular:      Rate and Rhythm: Normal rate and regular rhythm  Pulses:           Radial pulses are 2+ on the right side and 2+ on the left side  Heart sounds: No murmur heard  Pulmonary:      Effort: Pulmonary effort is normal  No respiratory distress  Breath sounds: Normal breath sounds  No wheezing  Musculoskeletal:      Cervical back: Neck supple  Skin:     General: Skin is warm and dry  Neurological:      Mental Status: She is alert        Deep Tendon Reflexes: Reflexes are normal and symmetric  Patient's shoes and socks were not removed  Labs:   No results found for: HGBA1C    No results found for: CHOL, HDL, TRIG, CHOLHDL    Lab Results   Component Value Date    CALCIUM 8 4 12/08/2021    K 4 0 12/08/2021    CO2 25 12/08/2021     12/08/2021    BUN 16 12/08/2021    CREATININE 0 77 12/08/2021        eGFR   Date Value Ref Range Status   12/08/2021 92 ml/min/1 73sq m Final       Lab Results   Component Value Date    ALT 31 12/08/2021    AST 17 12/08/2021    ALKPHOS 82 12/08/2021       Lab Results   Component Value Date    MSA8KMGICTSJ 0 259 (L) 12/08/2021    TSH 3 3 04/29/2022           Plan:    Diagnoses and all orders for this visit:    Hyperthyroidism  TSH in April 2022 was normal at 3 3 and free T4 was normal at 1 2  Labs were previously abnormal likely due to thyroiditis  Not on thyroid medication  Monitor labs and check for antibodies  -     T4, free; Future  -     TSH, 3rd generation; Future  -     Thyroid stimulating immunoglobulin; Future  -     Thyroid Antibodies Panel; Future  -     Thyrotropin receptor antibody; Future  -     T4, free; Future  -     TSH, 3rd generation; Future    Vitamin D deficiency  Vitamin D previously low at 21 2  Take vit D3 2000 IU daily  -     Cholecalciferol (Vitamin D3) 50 MCG (2000 UT) capsule; Take 1 capsule (2,000 Units total) by mouth daily  -     Vitamin D 25 hydroxy; Future          Discussed with the patient and all questions fully answered  She will call me if any problems arise      Counseled patient on diagnostic results, prognosis, risk and benefit of treatment options, instruction for management, importance of treatment compliance, risk  factor reduction and impressions      Maryan Perea PA-C

## 2022-09-14 ENCOUNTER — ANESTHESIA EVENT (OUTPATIENT)
Dept: GASTROENTEROLOGY | Facility: HOSPITAL | Age: 47
End: 2022-09-14

## 2022-09-14 ENCOUNTER — HOSPITAL ENCOUNTER (OUTPATIENT)
Dept: GASTROENTEROLOGY | Facility: HOSPITAL | Age: 47
Setting detail: OUTPATIENT SURGERY
Discharge: HOME | End: 2022-09-14
Attending: INTERNAL MEDICINE
Payer: COMMERCIAL

## 2022-09-14 ENCOUNTER — ANESTHESIA (OUTPATIENT)
Dept: GASTROENTEROLOGY | Facility: HOSPITAL | Age: 47
End: 2022-09-14

## 2022-09-14 VITALS
DIASTOLIC BLOOD PRESSURE: 71 MMHG | HEART RATE: 63 BPM | RESPIRATION RATE: 18 BRPM | TEMPERATURE: 96.2 F | OXYGEN SATURATION: 96 % | SYSTOLIC BLOOD PRESSURE: 121 MMHG

## 2022-09-14 DIAGNOSIS — K21.9 GASTROESOPHAGEAL REFLUX DISEASE, UNSPECIFIED WHETHER ESOPHAGITIS PRESENT: ICD-10-CM

## 2022-09-14 LAB
EXT PREGNANCY TEST URINE: NEGATIVE
EXT. CONTROL: NORMAL

## 2022-09-14 PROCEDURE — 88305 TISSUE EXAM BY PATHOLOGIST: CPT | Performed by: PATHOLOGY

## 2022-09-14 PROCEDURE — 43239 EGD BIOPSY SINGLE/MULTIPLE: CPT | Performed by: INTERNAL MEDICINE

## 2022-09-14 PROCEDURE — 81025 URINE PREGNANCY TEST: CPT | Performed by: ANESTHESIOLOGY

## 2022-09-14 RX ORDER — SODIUM CHLORIDE 9 MG/ML
INJECTION, SOLUTION INTRAVENOUS CONTINUOUS PRN
Status: DISCONTINUED | OUTPATIENT
Start: 2022-09-14 | End: 2022-09-14

## 2022-09-14 RX ORDER — PROPOFOL 10 MG/ML
INJECTION, EMULSION INTRAVENOUS AS NEEDED
Status: DISCONTINUED | OUTPATIENT
Start: 2022-09-14 | End: 2022-09-14

## 2022-09-14 RX ADMIN — SODIUM CHLORIDE: 9 INJECTION, SOLUTION INTRAVENOUS at 07:51

## 2022-09-14 RX ADMIN — LIDOCAINE HYDROCHLORIDE 100 MG: 20 INJECTION INTRAVENOUS at 07:56

## 2022-09-14 RX ADMIN — PROPOFOL 50 MG: 10 INJECTION, EMULSION INTRAVENOUS at 07:57

## 2022-09-14 RX ADMIN — PROPOFOL 50 MG: 10 INJECTION, EMULSION INTRAVENOUS at 08:02

## 2022-09-14 RX ADMIN — PROPOFOL 150 MG: 10 INJECTION, EMULSION INTRAVENOUS at 07:56

## 2022-09-14 RX ADMIN — PROPOFOL 50 MG: 10 INJECTION, EMULSION INTRAVENOUS at 08:00

## 2022-09-14 NOTE — ANESTHESIA PREPROCEDURE EVALUATION
Procedure:  EGD    Relevant Problems   ENDO   (+) Hyperthyroidism        Physical Exam    Airway    Mallampati score: II  TM Distance: >3 FB  Neck ROM: full     Dental   No notable dental hx     Cardiovascular  Cardiovascular exam normal    Pulmonary  Pulmonary exam normal     Other Findings        Anesthesia Plan  ASA Score- 1     Anesthesia Type- IV sedation with anesthesia with ASA Monitors  Additional Monitors:   Airway Plan:           Plan Factors-Exercise tolerance (METS): >4 METS  Chart reviewed  EKG reviewed  Imaging results reviewed  Existing labs reviewed  Patient summary reviewed  Patient is not a current smoker  Patient not instructed to abstain from smoking on day of procedure  Patient did not smoke on day of surgery  Induction-     Postoperative Plan-     Informed Consent- Anesthetic plan and risks discussed with patient  I personally reviewed this patient with the CRNA  Discussed and agreed on the Anesthesia Plan with the CRNA  Ronal Vogt

## 2022-09-14 NOTE — ANESTHESIA POSTPROCEDURE EVALUATION
Post-Op Assessment Note    CV Status:  Stable  Pain Score: 0    Pain management: adequate     Mental Status:  Awake and sleepy   Hydration Status:  Euvolemic   PONV Controlled:  Controlled   Airway Patency:  Patent      Post Op Vitals Reviewed: Yes      Staff: CRNA         No complications documented      /57 (09/14/22 0809)    Temp (!) 96 2 °F (35 7 °C) (09/14/22 0809)    Pulse 76 (09/14/22 0809)   Resp 18 (09/14/22 0809)    SpO2 98 % (09/14/22 0809)

## 2022-09-14 NOTE — INTERVAL H&P NOTE
H&P reviewed  After examining the patient I find no changes in the patients condition since the H&P had been written      Vitals:    09/14/22 0740   BP: 140/96   Pulse: 68   Resp: 18   Temp: (!) 96 3 °F (35 7 °C)   SpO2: 98%

## 2022-09-20 ENCOUNTER — HOSPITAL ENCOUNTER (EMERGENCY)
Facility: HOSPITAL | Age: 47
Discharge: HOME | End: 2022-09-20
Attending: EMERGENCY MEDICINE
Payer: COMMERCIAL

## 2022-09-20 ENCOUNTER — APPOINTMENT (EMERGENCY)
Dept: CT IMAGING | Facility: HOSPITAL | Age: 47
End: 2022-09-20
Payer: COMMERCIAL

## 2022-09-20 VITALS
HEIGHT: 60 IN | TEMPERATURE: 98 F | RESPIRATION RATE: 20 BRPM | SYSTOLIC BLOOD PRESSURE: 139 MMHG | WEIGHT: 171.08 LBS | BODY MASS INDEX: 33.59 KG/M2 | HEART RATE: 72 BPM | DIASTOLIC BLOOD PRESSURE: 97 MMHG | OXYGEN SATURATION: 100 %

## 2022-09-20 DIAGNOSIS — R10.9 ABDOMINAL PAIN: Primary | ICD-10-CM

## 2022-09-20 LAB
ALBUMIN SERPL BCP-MCNC: 3.9 G/DL (ref 3.5–5)
ALP SERPL-CCNC: 82 U/L (ref 34–104)
ALT SERPL W P-5'-P-CCNC: 23 U/L (ref 7–52)
ANION GAP SERPL CALCULATED.3IONS-SCNC: 5 MMOL/L (ref 4–13)
AST SERPL W P-5'-P-CCNC: 32 U/L (ref 13–39)
BASOPHILS # BLD AUTO: 0.02 THOUSANDS/ΜL (ref 0–0.1)
BASOPHILS NFR BLD AUTO: 0 % (ref 0–1)
BILIRUB SERPL-MCNC: 0.69 MG/DL (ref 0.2–1)
BUN SERPL-MCNC: 19 MG/DL (ref 5–25)
CALCIUM SERPL-MCNC: 9.1 MG/DL (ref 8.4–10.2)
CHLORIDE SERPL-SCNC: 105 MMOL/L (ref 96–108)
CO2 SERPL-SCNC: 29 MMOL/L (ref 21–32)
CREAT SERPL-MCNC: 0.67 MG/DL (ref 0.6–1.3)
EOSINOPHIL # BLD AUTO: 0.09 THOUSAND/ΜL (ref 0–0.61)
EOSINOPHIL NFR BLD AUTO: 1 % (ref 0–6)
ERYTHROCYTE [DISTWIDTH] IN BLOOD BY AUTOMATED COUNT: 17.1 % (ref 11.6–15.1)
GFR SERPL CREATININE-BSD FRML MDRD: 103 ML/MIN/1.73SQ M
GLUCOSE SERPL-MCNC: 88 MG/DL (ref 65–140)
HCT VFR BLD AUTO: 32.3 % (ref 34.8–46.1)
HGB BLD-MCNC: 9.9 G/DL (ref 11.5–15.4)
IMM GRANULOCYTES # BLD AUTO: 0.02 THOUSAND/UL (ref 0–0.2)
IMM GRANULOCYTES NFR BLD AUTO: 0 % (ref 0–2)
LIPASE SERPL-CCNC: 19 U/L (ref 11–82)
LYMPHOCYTES # BLD AUTO: 1.65 THOUSANDS/ΜL (ref 0.6–4.47)
LYMPHOCYTES NFR BLD AUTO: 23 % (ref 14–44)
MCH RBC QN AUTO: 23.3 PG (ref 26.8–34.3)
MCHC RBC AUTO-ENTMCNC: 30.7 G/DL (ref 31.4–37.4)
MCV RBC AUTO: 76 FL (ref 82–98)
MONOCYTES # BLD AUTO: 0.37 THOUSAND/ΜL (ref 0.17–1.22)
MONOCYTES NFR BLD AUTO: 5 % (ref 4–12)
NEUTROPHILS # BLD AUTO: 5.17 THOUSANDS/ΜL (ref 1.85–7.62)
NEUTS SEG NFR BLD AUTO: 71 % (ref 43–75)
NRBC BLD AUTO-RTO: 0 /100 WBCS
PLATELET # BLD AUTO: 287 THOUSANDS/UL (ref 149–390)
PMV BLD AUTO: 9.4 FL (ref 8.9–12.7)
POTASSIUM SERPL-SCNC: 3.9 MMOL/L (ref 3.5–5.3)
PROT SERPL-MCNC: 7.2 G/DL (ref 6.4–8.4)
RBC # BLD AUTO: 4.25 MILLION/UL (ref 3.81–5.12)
SODIUM SERPL-SCNC: 139 MMOL/L (ref 135–147)
WBC # BLD AUTO: 7.32 THOUSAND/UL (ref 4.31–10.16)

## 2022-09-20 PROCEDURE — 85025 COMPLETE CBC W/AUTO DIFF WBC: CPT | Performed by: EMERGENCY MEDICINE

## 2022-09-20 PROCEDURE — 36415 COLL VENOUS BLD VENIPUNCTURE: CPT | Performed by: EMERGENCY MEDICINE

## 2022-09-20 PROCEDURE — G1004 CDSM NDSC: HCPCS

## 2022-09-20 PROCEDURE — 96361 HYDRATE IV INFUSION ADD-ON: CPT

## 2022-09-20 PROCEDURE — 93005 ELECTROCARDIOGRAM TRACING: CPT

## 2022-09-20 PROCEDURE — 99285 EMERGENCY DEPT VISIT HI MDM: CPT | Performed by: EMERGENCY MEDICINE

## 2022-09-20 PROCEDURE — 80053 COMPREHEN METABOLIC PANEL: CPT | Performed by: EMERGENCY MEDICINE

## 2022-09-20 PROCEDURE — 99284 EMERGENCY DEPT VISIT MOD MDM: CPT

## 2022-09-20 PROCEDURE — 96375 TX/PRO/DX INJ NEW DRUG ADDON: CPT

## 2022-09-20 PROCEDURE — 76705 ECHO EXAM OF ABDOMEN: CPT | Performed by: EMERGENCY MEDICINE

## 2022-09-20 PROCEDURE — 96374 THER/PROPH/DIAG INJ IV PUSH: CPT

## 2022-09-20 PROCEDURE — 74177 CT ABD & PELVIS W/CONTRAST: CPT

## 2022-09-20 PROCEDURE — 83690 ASSAY OF LIPASE: CPT | Performed by: EMERGENCY MEDICINE

## 2022-09-20 RX ORDER — HYDROCODONE BITARTRATE AND ACETAMINOPHEN 5; 325 MG/1; MG/1
1 TABLET ORAL EVERY 6 HOURS PRN
Qty: 15 TABLET | Refills: 0 | Status: SHIPPED | OUTPATIENT
Start: 2022-09-20

## 2022-09-20 RX ORDER — ONDANSETRON 2 MG/ML
4 INJECTION INTRAMUSCULAR; INTRAVENOUS ONCE
Status: COMPLETED | OUTPATIENT
Start: 2022-09-20 | End: 2022-09-20

## 2022-09-20 RX ORDER — MORPHINE SULFATE 10 MG/ML
6 INJECTION, SOLUTION INTRAMUSCULAR; INTRAVENOUS ONCE
Status: COMPLETED | OUTPATIENT
Start: 2022-09-20 | End: 2022-09-20

## 2022-09-20 RX ADMIN — IOHEXOL 100 ML: 350 INJECTION, SOLUTION INTRAVENOUS at 13:23

## 2022-09-20 RX ADMIN — ONDANSETRON 4 MG: 2 INJECTION INTRAMUSCULAR; INTRAVENOUS at 12:16

## 2022-09-20 RX ADMIN — MORPHINE SULFATE 6 MG: 10 INJECTION INTRAVENOUS at 12:16

## 2022-09-20 RX ADMIN — SODIUM CHLORIDE 1000 ML: 0.9 INJECTION, SOLUTION INTRAVENOUS at 12:15

## 2022-09-20 NOTE — ED PROVIDER NOTES
History  Chief Complaint   Patient presents with    Abdominal Pain     Had EGD 1 week ago and has had some slight abd pain since then, but today it is unbearable  Reached out to GI and they recommended coming in for an eval  Pt took 3 motrin prior to coming and a Protonix without relief  EGD Findings: Small hiatal hernia - GE junction 29 cm from the incisors, diaphragmatic impression 33 cm from the incisors     Nicolasa Whitaker is a 50 y o  female who presents with the chief complaint of epigastric abdominal pain that started this morning  The pain is sharp and non-radiating  She has a history of mild epigastric pain and post-prandial nausea/vomiting for which she is following with GI  She had an EGD one week ago with some biopsies taken  The only abnormality identified was a small hiatal hernia  No chest pain, no shortness of breath, no diaphoresis  She took some ibuprofen and pantoprazole at home without relief  History provided by:  Patient and medical records   used: No    Abdominal Pain  Pain location:  Epigastric  Pain quality: cramping and sharp    Pain radiates to:  Does not radiate  Pain severity:  Moderate  Onset quality:  Gradual  Duration:  1 day  Timing:  Constant  Progression:  Unchanged  Chronicity:  New  Context: not previous surgeries (recent EGD, no surgery)    Relieved by:  Nothing  Worsened by:  Nothing  Ineffective treatments:  OTC medications  Associated symptoms: nausea and vomiting    Associated symptoms: no chest pain, no chills, no diarrhea, no dysuria, no fever, no hematochezia and no shortness of breath        Prior to Admission Medications   Prescriptions Last Dose Informant Patient Reported? Taking?    Cholecalciferol (Vitamin D3) 50 MCG (2000 UT) capsule   No No   Sig: Take 1 capsule (2,000 Units total) by mouth daily   hydrOXYzine HCL (ATARAX) 25 mg tablet   No No   Sig: Take 1 tablet (25 mg total) by mouth every 6 (six) hours as needed for anxiety   Patient not taking: No sig reported   meclizine (ANTIVERT) 25 mg tablet  Child No No   Sig: Take 1 tablet (25 mg total) by mouth every 8 (eight) hours as needed for dizziness   Patient not taking: No sig reported   omeprazole (PriLOSEC) 20 mg delayed release capsule   No No   Sig: Take 1 capsule (20 mg total) by mouth 2 (two) times a day      Facility-Administered Medications: None       Past Medical History:   Diagnosis Date    Disease of thyroid gland        History reviewed  No pertinent surgical history  History reviewed  No pertinent family history  I have reviewed and agree with the history as documented  E-Cigarette/Vaping    E-Cigarette Use Never User      E-Cigarette/Vaping Substances     Social History     Tobacco Use    Smoking status: Never Smoker    Smokeless tobacco: Never Used   Vaping Use    Vaping Use: Never used   Substance Use Topics    Alcohol use: Never    Drug use: Never       Review of Systems   Constitutional: Negative for chills, diaphoresis and fever  Respiratory: Negative for shortness of breath  Cardiovascular: Negative for chest pain and palpitations  Gastrointestinal: Positive for abdominal pain, nausea and vomiting  Negative for diarrhea and hematochezia  Genitourinary: Negative for dysuria and frequency  Skin: Negative for rash  All other systems reviewed and are negative  Physical Exam  Physical Exam  Vitals and nursing note reviewed  Constitutional:       General: She is in acute distress  Appearance: She is well-developed  HENT:      Head: Normocephalic and atraumatic  Eyes:      Pupils: Pupils are equal, round, and reactive to light  Neck:      Vascular: No JVD  Cardiovascular:      Rate and Rhythm: Normal rate and regular rhythm  Heart sounds: Normal heart sounds  No murmur heard  No friction rub  No gallop  Pulmonary:      Effort: Pulmonary effort is normal  No respiratory distress  Breath sounds: Normal breath sounds   No wheezing or rales  Chest:      Chest wall: No tenderness  Abdominal:      Tenderness: There is abdominal tenderness in the epigastric area  There is no guarding or rebound  Musculoskeletal:         General: No tenderness  Normal range of motion  Cervical back: Normal range of motion  Skin:     General: Skin is warm and dry  Neurological:      General: No focal deficit present  Mental Status: She is alert and oriented to person, place, and time  Psychiatric:         Behavior: Behavior normal          Thought Content:  Thought content normal          Judgment: Judgment normal          Vital Signs  ED Triage Vitals [09/20/22 1152]   Temperature Pulse Respirations Blood Pressure SpO2   98 °F (36 7 °C) 72 20 139/97 100 %      Temp Source Heart Rate Source Patient Position - Orthostatic VS BP Location FiO2 (%)   Oral Monitor Sitting Right arm --      Pain Score       10 - Worst Possible Pain           Vitals:    09/20/22 1152   BP: 139/97   Pulse: 72   Patient Position - Orthostatic VS: Sitting         Visual Acuity      ED Medications  Medications   sodium chloride 0 9 % bolus 1,000 mL (1,000 mL Intravenous New Bag 9/20/22 1215)   morphine injection 6 mg (6 mg Intravenous Given 9/20/22 1216)   ondansetron (ZOFRAN) injection 4 mg (4 mg Intravenous Given 9/20/22 1216)   iohexol (OMNIPAQUE) 350 MG/ML injection (SINGLE-DOSE) 100 mL (100 mL Intravenous Given 9/20/22 1323)       Diagnostic Studies  Results Reviewed     Procedure Component Value Units Date/Time    Comprehensive metabolic panel [[id number]] Collected: 09/20/22 1216    Lab Status: Final result Specimen: Blood from Arm, Right Updated: 09/20/22 1249     Sodium 139 mmol/L      Potassium 3 9 mmol/L      Chloride 105 mmol/L      CO2 29 mmol/L      ANION GAP 5 mmol/L      BUN 19 mg/dL      Creatinine 0 67 mg/dL      Glucose 88 mg/dL      Calcium 9 1 mg/dL      AST 32 U/L      ALT 23 U/L      Alkaline Phosphatase 82 U/L      Total Protein 7 2 g/dL Albumin 3 9 g/dL      Total Bilirubin 0 69 mg/dL      eGFR 103 ml/min/1 73sq m     Narrative:      [place] guidelines for Chronic Kidney Disease (CKD):     Stage 1 with normal or high GFR (GFR > 90 mL/min/1 73 square meters)    Stage 2 Mild CKD (GFR = 60-89 mL/min/1 73 square meters)    Stage 3A Moderate CKD (GFR = 45-59 mL/min/1 73 square meters)    Stage 3B Moderate CKD (GFR = 30-44 mL/min/1 73 square meters)    Stage 4 Severe CKD (GFR = 15-29 mL/min/1 73 square meters)    Stage 5 End Stage CKD (GFR <15 mL/min/1 73 square meters)  Note: GFR calculation is accurate only with a steady state creatinine    Lipase [[id number]]  (Normal) Collected: 09/20/22 1216    Lab Status: Final result Specimen: Blood from Arm, Right Updated: 09/20/22 1249     Lipase 19 u/L     CBC and differential [[id number]]  (Abnormal) Collected: 09/20/22 1216    Lab Status: Final result Specimen: Blood from Arm, Right Updated: 09/20/22 1228     WBC 7 32 Thousand/uL      RBC 4 25 Million/uL      Hemoglobin 9 9 g/dL      Hematocrit 32 3 %      MCV 76 fL      MCH 23 3 pg      MCHC 30 7 g/dL      RDW 17 1 %      MPV 9 4 fL      Platelets 856 Thousands/uL      nRBC 0 /100 WBCs      Neutrophils Relative 71 %      Immat GRANS % 0 %      Lymphocytes Relative 23 %      Monocytes Relative 5 %      Eosinophils Relative 1 %      Basophils Relative 0 %      Neutrophils Absolute 5 17 Thousands/µL      Immature Grans Absolute 0 02 Thousand/uL      Lymphocytes Absolute 1 65 Thousands/µL      Monocytes Absolute 0 37 Thousand/µL      Eosinophils Absolute 0 09 Thousand/µL      Basophils Absolute 0 02 Thousands/µL     UA w Reflex to Microscopic w Reflex to Culture [[id number]]     Lab Status: No result Specimen: Urine                  CT abdomen pelvis with contrast   Final Result by Alfred Hernandez MD (09/20 2919)   No free air   No acute inflammatory stranding   Diverticulosis   Incidental 3 1 cm left adnexal cyst, can be evaluated with ultrasound performed in 6-8 weeks  A 5 cm cyst noted in the right cardiophrenic angle region but incompletely assessed probably a pericardial cyst consider nonemergent evaluation with CT or MRI of the chest with contrast for complete evaluation   The study was marked in EPIC for significant notification  Workstation performed: [id number]                    Procedures  Procedures         ED Course                               SBIRT 20yo+    Flowsheet Row Most Recent Value   SBIRT (25 yo +)    In order to provide better care to our patients, we are screening all of our patients for alcohol and drug use  Would it be okay to ask you these screening questions? No Filed at: 09/20/2022 1156                    [facility]  Number of Diagnoses or Management Options  Abdominal pain: new and requires workup  Diagnosis management comments: Background: 50 y o  female presents with chief complaint of epigastric abdominal pain       Differential includes but is not limited to: pancreatitis, gastritis, cholecystitis, choledocholithiasis, possible esophageal injury from egd, pyelonephritis, ureterolithiasis, non specific abdominal pain    Plan: cbc, cmp, lipase, urinalysis, ct scan, symptom control            Amount and/or Complexity of Data Reviewed  Clinical lab tests: ordered and reviewed  Tests in the radiology section of CPT®: ordered and reviewed    Risk of Complications, Morbidity, and/or Mortality  Presenting problems: high  Diagnostic procedures: high  Management options: high    Patient Progress  Patient progress: stable      Disposition  Final diagnoses:   Abdominal pain     Time reflects when diagnosis was documented in both MDM as applicable and the Disposition within this note     Time User Action Codes Description Comment    9/20/2022  2:15 PM Ryan Saez Add [R10 9] Abdominal pain       ED Disposition     ED Disposition   Discharge    Condition   Stable    Date/Time   Tue Sep 20, 2022  2:15 PM    Comment   Shwan Render discharge to home/self care  Follow-up Information     Follow up With Specialties Details Why [address] Internal Medicine Schedule an appointment as soon as possible for a visit in 1 week  [address]uman [phone]            Patient's Medications   Discharge Prescriptions    HYDROCODONE-ACETAMINOPHEN (NORCO) 5-325 MG PER TABLET    Take 1 tablet by mouth every 6 (six) hours as needed for pain for up to 15 doses Max Daily Amount: 4 tablets       Start Date: 9/20/2022 End Date: --       Order Dose: 1 tablet       Quantity: 15 tablet    Refills: 0       No discharge procedures on file      PDMP Review     None          ED Provider  Electronically Signed by           Jt Fierro MD  09/20/22 6127

## 2022-09-20 NOTE — DISCHARGE INSTRUCTIONS
Your CT scan revealed a 5 cm cyst noted in the right cardiophrenic angle  region (between your heart and lung) but it was incompletely assessed (not visualized properly because we weren't looking for it)  It is probably a pericardial cyst (benign condition)  The radiologist is recommending your PCP consider ordering a nonemergent evaluation with CT or MRI of the chest with contrast for complete evaluation as an outpatient

## 2022-09-20 NOTE — ED PROCEDURE NOTE
Procedure  POC Biliary US    Date/Time: 9/20/2022 2:23 PM  Performed by: Charmayne Muller, DO  Authorized by: Charmayne Muller, DO     Patient location:  ED  Performed by:  Resident  Other Assisting Provider: Yes (comment) (Dr Loyda Harris, Dr Natalie Dodd)    Procedure details:     Exam Type:  Diagnostic and educational    Indications: upper right quadrant abdominal pain, epigastric pain and nausea      Assessment for:  Cholecystitis and cholelithiasis    Views obtained: gallbladder (transverse and longitudinal), liver, common bile duct and portal triad      Image quality: limited diagnostic      Image availability:  Images available in PACS and video obtained  Findings:     Cholelithiasis: identified      Gallbladder wall:  Abnormal    Gallbladder wall thickened (>3 mm): yes      Gallbladder wall thickness (mm):  3    Pericholecystic fluid: not identified      Sonographic Lehman's sign: negative      Polyps: not identified      Mass: not identified    Interpretation:     Biliary ultrasound impressions: cholelithiasis                       Charmayne Muller, DO  09/20/22 3999

## 2022-09-21 LAB
ATRIAL RATE: 79 BPM
P AXIS: 53 DEGREES
PR INTERVAL: 132 MS
QRS AXIS: 19 DEGREES
QRSD INTERVAL: 80 MS
QT INTERVAL: 406 MS
QTC INTERVAL: 465 MS
T WAVE AXIS: 8 DEGREES
VENTRICULAR RATE: 79 BPM

## 2022-09-21 PROCEDURE — 93010 ELECTROCARDIOGRAM REPORT: CPT | Performed by: INTERNAL MEDICINE

## 2022-10-22 LAB
25(OH)D3+25(OH)D2 SERPL-MCNC: 27.8 NG/ML (ref 30–100)
T4 FREE SERPL DIALY-MCNC: 1.1 NG/DL
THYROGLOB AB SERPL-ACNC: 2.5 IU/ML (ref 0–0.9)
THYROPEROXIDASE AB SERPL-ACNC: 92 IU/ML (ref 0–34)
TSH RECEP AB SER-ACNC: <1.1 IU/L (ref 0–1.75)
TSH SERPL-ACNC: 2.4 UU/ML
TSI SER-ACNC: <0.1 IU/L (ref 0–0.55)

## 2022-10-25 ENCOUNTER — TELEPHONE (OUTPATIENT)
Dept: ENDOCRINOLOGY | Facility: CLINIC | Age: 47
End: 2022-10-25

## 2022-10-25 NOTE — TELEPHONE ENCOUNTER
----- Message from Jacqui Hudson PA-C sent at 10/25/2022  1:19 PM EDT -----  Please call the patient regarding her abnormal result  Thyroid function tests are normal    Thyroid antibodies are positive suggestive that she should be at risk for hypothyroidism in the future  Will continue to monitor labs  Vitamin D is low at 27 8 but improving from 21 2  Continue vitamin D3 2000 IU daily

## 2022-10-31 ENCOUNTER — OFFICE VISIT (OUTPATIENT)
Dept: INTERNAL MEDICINE CLINIC | Facility: CLINIC | Age: 47
End: 2022-10-31

## 2022-10-31 VITALS
HEIGHT: 60 IN | SYSTOLIC BLOOD PRESSURE: 120 MMHG | DIASTOLIC BLOOD PRESSURE: 80 MMHG | OXYGEN SATURATION: 95 % | BODY MASS INDEX: 33.18 KG/M2 | HEART RATE: 88 BPM | WEIGHT: 169 LBS

## 2022-10-31 DIAGNOSIS — G44.52 NEW DAILY PERSISTENT HEADACHE: ICD-10-CM

## 2022-10-31 DIAGNOSIS — H81.399 PERIPHERAL VERTIGO: ICD-10-CM

## 2022-10-31 DIAGNOSIS — F50.89 PSYCHOGENIC VOMITING, UNSPECIFIED WHETHER NAUSEA PRESENT: ICD-10-CM

## 2022-10-31 DIAGNOSIS — E05.90 HYPERTHYROIDISM: ICD-10-CM

## 2022-10-31 DIAGNOSIS — Z00.00 HEALTH CARE MAINTENANCE: ICD-10-CM

## 2022-10-31 DIAGNOSIS — K21.9 GASTROESOPHAGEAL REFLUX DISEASE WITHOUT ESOPHAGITIS: Primary | ICD-10-CM

## 2022-10-31 DIAGNOSIS — I31.8: ICD-10-CM

## 2022-10-31 PROBLEM — R11.14 BILIOUS VOMITING: Status: ACTIVE | Noted: 2022-10-31

## 2022-10-31 RX ORDER — OMEPRAZOLE 40 MG/1
40 CAPSULE, DELAYED RELEASE ORAL 2 TIMES DAILY
Qty: 180 CAPSULE | Refills: 3 | Status: SHIPPED | OUTPATIENT
Start: 2022-10-31

## 2022-10-31 NOTE — ASSESSMENT & PLAN NOTE
Recorded history is patient has a hyper thyroidism in the past   Apparently was treated and thyroid function has normalized

## 2022-10-31 NOTE — ASSESSMENT & PLAN NOTE
Again with previous studies possible pericardial cyst   Will be going for CT scan the chest as requested  Renal function is stable  Swill be with and without contrast both oral and IV which may help elucidate her reflux disease also  The seen after this studies performed discuss the results and again with the entry is needed

## 2022-10-31 NOTE — ASSESSMENT & PLAN NOTE
Apparently prior to the having to commute by car to [place] patient did not have headaches on a regular basis  these headaches despite the fact the patient is no longer commuting are still persistent  I do have concerns whether there may be an emotional component not only with her headaches but with her GI symptoms    On questioning patient apparently had a medication for anxiety previously but unable to tolerate them

## 2022-10-31 NOTE — ASSESSMENT & PLAN NOTE
Apparently with her GI symptoms patient is also a problems with continued vomiting  Apparently is forcing herself to throw up when she has irritation, GI symptoms  Again this is been recorded to to take place on a daily basis    Will continue workup evaluation by Gastroenterology

## 2022-10-31 NOTE — ASSESSMENT & PLAN NOTE
Patient is a 40-year-old female history of hyperthyroidism in the past now euthyroid, presenting today for evaluation and establishment in our practice  Patient apparently has been having difficulties with severe gastroesophageal reflux over the past few years  She has establish care with a gastroenterologist and will be having an EGD in the near future  She remains on PPI eyes and has been treated unsuccessfully with continued symptoms  The with workup evaluation she was found to have a cyst which is felt to be pericardial in nature and suggesting that she have a CT scan with contrast for further evaluation  With her reflux disease she has had persistent left-sided chest discomfort and pressure and states that apparently on a daily basis she is vomiting  Apparently aids only small meals more often which does not help  Does have difficulties with his especially spicy foods  Along with this patient also has a history of recurrent headaches which began after patient under lot of stress with living locally and commuting to [place] on a daily basis  With the diagnosis of a pericardial cyst she is being sent for CT scan of chest for further evaluation  The with her reflux disease again she will follow-up with her gastroenterologist but is not scheduled for any further procedures till December    Will be seen in the office when she has had CT scan performed and discuss further evaluation and treatment if necessary

## 2022-10-31 NOTE — PROGRESS NOTES
Name: Chacho Ricks      : 1975      MRN: [id number]  Encounter Provider: Mayte Zheng DO  Encounter Date: 10/31/2022   Encounter department: [address] [place] [address] INTERNAL MEDICINE    Assessment & Plan     1  Gastroesophageal reflux disease without esophagitis  -     omeprazole (PriLOSEC) 40 MG capsule; Take 1 capsule (40 mg total) by mouth 2 (two) times a day    2  Peripheral vertigo    3  New daily persistent headache  Assessment & Plan:  Apparently prior to the having to commute by car to [place] patient did not have headaches on a regular basis  these headaches despite the fact the patient is no longer commuting are still persistent  I do have concerns whether there may be an emotional component not only with her headaches but with her GI symptoms  On questioning patient apparently had a medication for anxiety previously but unable to tolerate them    Orders:  -     Ambulatory Referral to Neurology; Future    4  Acquired pericardial cyst  Assessment & Plan:  Again with previous studies possible pericardial cyst   Will be going for CT scan the chest as requested  Renal function is stable  Swill be with and without contrast both oral and IV which may help elucidate her reflux disease also  The seen after this studies performed discuss the results and again with the entry is needed  Orders:  -     CT chest w contrast; Future; Expected date: 10/31/2022    5  Health care maintenance  Assessment & Plan:  Patient is a 49-year-old female history of hyperthyroidism in the past now euthyroid, presenting today for evaluation and establishment in our practice  Patient apparently has been having difficulties with severe gastroesophageal reflux over the past few years  She has establish care with a gastroenterologist and will be having an EGD in the near future  She remains on PPI eyes and has been treated unsuccessfully with continued symptoms    The with workup evaluation she was found to have a cyst which is felt to be pericardial in nature and suggesting that she have a CT scan with contrast for further evaluation  With her reflux disease she has had persistent left-sided chest discomfort and pressure and states that apparently on a daily basis she is vomiting  Apparently aids only small meals more often which does not help  Does have difficulties with his especially spicy foods  Along with this patient also has a history of recurrent headaches which began after patient under lot of stress with living locally and commuting to [place] on a daily basis  With the diagnosis of a pericardial cyst she is being sent for CT scan of chest for further evaluation  The with her reflux disease again she will follow-up with her gastroenterologist but is not scheduled for any further procedures till December  Will be seen in the office when she has had CT scan performed and discuss further evaluation and treatment if necessary      6  Hyperthyroidism  Assessment & Plan:  Recorded history is patient has a hyper thyroidism in the past   Apparently was treated and thyroid function has normalized  7  Psychogenic vomiting, unspecified whether nausea present  Assessment & Plan:  Apparently with her GI symptoms patient is also a problems with continued vomiting  Apparently is forcing herself to throw up when she has irritation, GI symptoms  Again this is been recorded to to take place on a daily basis  Will continue workup evaluation by Gastroenterology             Subjective     Patient is a 78-year-old female history medical problems as outlined in her chart who is here today to establish care in our practice  Patient is had problems with gastroesophageal reflux disease which is persistent and ongoing, daily vomiting, persistent headaches on a daily basis  Communication is difficult because the patient does not speak English is [place] and her daughters in attendance to act as       Review of Systems   Constitutional: Positive for appetite change (With her reflux symptoms apparently eating small meals more often)  Negative for activity change, chills, diaphoresis, fatigue, fever and unexpected weight change  HENT: Negative  Eyes: Negative  Respiratory: Negative  Cardiovascular: Negative  Gastrointestinal: Positive for vomiting  Negative for abdominal distention, abdominal pain, anal bleeding, blood in stool, constipation, diarrhea, nausea and rectal pain  Reflux   Endocrine: Negative  Genitourinary: Negative  Musculoskeletal: Negative  Skin: Negative  Allergic/Immunologic: Negative  Neurological: Positive for headaches  Negative for dizziness, tremors, seizures, syncope, facial asymmetry, speech difficulty, weakness, light-headedness and numbness  Hematological: Negative  Psychiatric/Behavioral: Positive for sleep disturbance ( apparently has having difficulties with sleep and takes melatonin on a regular basis)  Negative for agitation, behavioral problems, confusion, decreased concentration, dysphoric mood, hallucinations, self-injury and suicidal ideas  The patient is not nervous/anxious and is not hyperactive  Past Medical History:   Diagnosis Date   • Disease of thyroid gland      No past surgical history on file  No family history on file    Social History     Socioeconomic History   • Marital status: /Civil Union     Spouse name: None   • Number of children: None   • Years of education: None   • Highest education level: None   Occupational History   • None   Tobacco Use   • Smoking status: Never Smoker   • Smokeless tobacco: Never Used   Vaping Use   • Vaping Use: Never used   Substance and Sexual Activity   • Alcohol use: Never   • Drug use: Never   • Sexual activity: None   Other Topics Concern   • None   Social History Narrative   • None     Social Determinants of Health     Financial Resource Strain: Not on file   Food Insecurity: Not on file   Transportation Needs: Not on file   Physical Activity: Not on file   Stress: Not on file   Social Connections: Not on file   Intimate Partner Violence: Not on file   Housing Stability: Not on file     Current Outpatient Medications on File Prior to Visit   Medication Sig   • Cholecalciferol (Vitamin D3) 50 MCG (2000 UT) capsule Take 1 capsule (2,000 Units total) by mouth daily   • [DISCONTINUED] omeprazole (PriLOSEC) 20 mg delayed release capsule Take 1 capsule (20 mg total) by mouth 2 (two) times a day   • HYDROcodone-acetaminophen (NORCO) 5-325 mg per tablet Take 1 tablet by mouth every 6 (six) hours as needed for pain for up to 15 doses Max Daily Amount: 4 tablets (Patient not taking: Reported on 10/31/2022)   • hydrOXYzine HCL (ATARAX) 25 mg tablet Take 1 tablet (25 mg total) by mouth every 6 (six) hours as needed for anxiety (Patient not taking: No sig reported)   • meclizine (ANTIVERT) 25 mg tablet Take 1 tablet (25 mg total) by mouth every 8 (eight) hours as needed for dizziness (Patient not taking: No sig reported)     No Known Allergies  Immunization History   Administered Date(s) Administered   • COVID-19 PFIZER VACCINE 0 3 ML IM 04/23/2021, 05/15/2021       Objective     /80   Pulse 88   Ht 5' (1 524 m)   Wt 76 7 kg (169 lb)   SpO2 95%   BMI 33 01 kg/m²     Physical Exam  Vitals and nursing note reviewed  Constitutional:       General: She is not in acute distress  Appearance: Normal appearance  She is obese  She is not ill-appearing, toxic-appearing or diaphoretic  Comments: Very pleasant obese 27-year-old female who is awake alert  Accompanied by her daughter who is the , patient's speaks Sandhya Divine:      Head: Normocephalic and atraumatic  Right Ear: Tympanic membrane, ear canal and external ear normal  There is no impacted cerumen  Left Ear: Tympanic membrane, ear canal and external ear normal  There is no impacted cerumen        Nose: Nose normal  No congestion or rhinorrhea  Mouth/Throat:      Mouth: Mucous membranes are moist       Pharynx: Oropharynx is clear  No oropharyngeal exudate or posterior oropharyngeal erythema  Eyes:      General: No scleral icterus  Right eye: No discharge  Left eye: No discharge  Extraocular Movements: Extraocular movements intact  Conjunctiva/sclera: Conjunctivae normal       Pupils: Pupils are equal, round, and reactive to light  Neck:      Vascular: No carotid bruit  Cardiovascular:      Rate and Rhythm: Normal rate and regular rhythm  Pulses: Normal pulses  Heart sounds: Normal heart sounds  No murmur heard  No friction rub  No gallop  Pulmonary:      Effort: Pulmonary effort is normal  No respiratory distress  Breath sounds: Normal breath sounds  No stridor  No wheezing, rhonchi or rales  Chest:      Chest wall: No tenderness  Abdominal:      General: Bowel sounds are normal  There is no distension  Palpations: Abdomen is soft  There is no mass  Tenderness: There is no abdominal tenderness  There is no right CVA tenderness, left CVA tenderness, guarding or rebound  Hernia: No hernia is present  Musculoskeletal:         General: No swelling, tenderness, deformity or signs of injury  Normal range of motion  Cervical back: Normal range of motion and neck supple  No rigidity or tenderness  Right lower leg: No edema  Left lower leg: No edema  Lymphadenopathy:      Cervical: No cervical adenopathy  Skin:     General: Skin is warm and dry  Capillary Refill: Capillary refill takes less than 2 seconds  Coloration: Skin is not jaundiced or pale  Findings: No bruising, erythema, lesion or rash  Neurological:      General: No focal deficit present  Mental Status: She is alert and oriented to person, place, and time  Mental status is at baseline  Cranial Nerves: No cranial nerve deficit        Sensory: No sensory deficit  Motor: No weakness  Coordination: Coordination normal       Gait: Gait normal       Deep Tendon Reflexes: Reflexes normal    Psychiatric:         Mood and Affect: Mood normal          Behavior: Behavior normal          Thought Content:  Thought content normal          Judgment: Judgment normal        Keisha Darling DO

## 2022-11-09 ENCOUNTER — HOSPITAL ENCOUNTER (OUTPATIENT)
Dept: RADIOLOGY | Facility: HOSPITAL | Age: 47
Discharge: HOME | End: 2022-11-09

## 2022-11-09 DIAGNOSIS — I31.8: ICD-10-CM

## 2022-11-09 RX ADMIN — IOHEXOL 100 ML: 350 INJECTION, SOLUTION INTRAVENOUS at 14:16

## 2022-11-18 ENCOUNTER — OFFICE VISIT (OUTPATIENT)
Dept: INTERNAL MEDICINE CLINIC | Facility: CLINIC | Age: 47
End: 2022-11-18

## 2022-11-18 VITALS
WEIGHT: 171 LBS | TEMPERATURE: 98 F | HEART RATE: 75 BPM | BODY MASS INDEX: 33.57 KG/M2 | RESPIRATION RATE: 19 BRPM | SYSTOLIC BLOOD PRESSURE: 130 MMHG | OXYGEN SATURATION: 97 % | DIASTOLIC BLOOD PRESSURE: 72 MMHG | HEIGHT: 60 IN

## 2022-11-18 DIAGNOSIS — R10.84 GENERALIZED ABDOMINAL PAIN: ICD-10-CM

## 2022-11-18 DIAGNOSIS — I31.8: ICD-10-CM

## 2022-11-18 DIAGNOSIS — E05.90 HYPERTHYROIDISM: ICD-10-CM

## 2022-11-18 DIAGNOSIS — F50.89 PSYCHOGENIC VOMITING, UNSPECIFIED WHETHER NAUSEA PRESENT: ICD-10-CM

## 2022-11-18 DIAGNOSIS — F41.9 ANXIETY: Primary | ICD-10-CM

## 2022-11-18 LAB
SL AMB  POCT GLUCOSE, UA: NORMAL
SL AMB LEUKOCYTE ESTERASE,UA: NORMAL
SL AMB POCT BILIRUBIN,UA: NORMAL
SL AMB POCT BLOOD,UA: NORMAL
SL AMB POCT CLARITY,UA: CLEAR
SL AMB POCT COLOR,UA: YELLOW
SL AMB POCT KETONES,UA: NORMAL
SL AMB POCT NITRITE,UA: NORMAL
SL AMB POCT PH,UA: 6
SL AMB POCT SPECIFIC GRAVITY,UA: 1.01
SL AMB POCT URINE PROTEIN: NORMAL
SL AMB POCT UROBILINOGEN: 0.2

## 2022-11-18 RX ORDER — ESCITALOPRAM OXALATE 5 MG/1
5 TABLET ORAL DAILY
Qty: 30 TABLET | Refills: 5 | Status: SHIPPED | OUTPATIENT
Start: 2022-11-18

## 2022-11-18 NOTE — ASSESSMENT & PLAN NOTE
Patient admits to anxiety disorder  Again her previous physician had attempted to treat her with medication in order to help with this but she never took it  We have made a decision today the place the patient on low dose of Lexapro 5 mg daily  Weeks point to the patient and her daughter who was in attendance that may take a period of time prior to patient having positive affects from the medication  Also instructed the patient that if any difficulties with tolerating the medication that she has to stop it immediately and call us at the office and we would make some decisions to possible alternatives  At this point we do not feel the patient needs psychological counseling and she does have significant family support    Patient will be returning to the office approximately 4-6 weeks for re-evaluation

## 2022-11-18 NOTE — ASSESSMENT & PLAN NOTE
A to be thorough patient did go for CT scan of her chest with contrast   Did reveal that she has a benign a pericardial cyst on the right hand side  Again she was told this was not a tumor and most likely may have been congenital   At this point no further workup and evaluation is necessary but I did offer her a consultation and evaluation by thoracic surgeon which she declines  She was told if any changes or concerns about this to please call

## 2022-11-18 NOTE — ASSESSMENT & PLAN NOTE
The patient does have a lot of emotional issues  Her previous physician had attempted to place her on medication for this but she did not initiate treatment  We discussed with her the importance of treatment this point time to control her symptoms

## 2022-11-18 NOTE — PROGRESS NOTES
Name: Bijal Becerra      : 1975      MRN: [id number]  Encounter Provider: Carol Ta DO  Encounter Date: 2022   Encounter department: [facility]    Assessment & Plan     1  Anxiety  Assessment & Plan:  Patient admits to anxiety disorder  Again her previous physician had attempted to treat her with medication in order to help with this but she never took it  We have made a decision today the place the patient on low dose of Lexapro 5 mg daily  Weeks point to the patient and her daughter who was in attendance that may take a period of time prior to patient having positive affects from the medication  Also instructed the patient that if any difficulties with tolerating the medication that she has to stop it immediately and call us at the office and we would make some decisions to possible alternatives  At this point we do not feel the patient needs psychological counseling and she does have significant family support  Patient will be returning to the office approximately 4-6 weeks for re-evaluation    Orders:  -     escitalopram (LEXAPRO) 5 mg tablet; Take 1 tablet (5 mg total) by mouth daily    2  Acquired pericardial cyst  Assessment & Plan:  A to be thorough patient did go for CT scan of her chest with contrast   Did reveal that she has a benign a pericardial cyst on the right hand side  Again she was told this was not a tumor and most likely may have been congenital   At this point no further workup and evaluation is necessary but I did offer her a consultation and evaluation by thoracic surgeon which she declines  She was told if any changes or concerns about this to please call  3  Psychogenic vomiting, unspecified whether nausea present  Assessment & Plan:  The patient does have a lot of emotional issues  Her previous physician had attempted to place her on medication for this but she did not initiate treatment    We discussed with her the importance of treatment this point time to control her symptoms  4  Hyperthyroidism  Assessment & Plan:  I have urged the patient continue to follow-up with endocrinology  Subjective     Patient is a 50year old female history of medical problems as outlined previously who is here today for follow-up accompanied by her daughter  Patient since last seen did go for studies for evaluation of abnormality seen on chest   We did discuss the results of studies showing benign pericardial cyst that does not need any further workup or evaluation at this time  We did have a long discussion with the patient and her daughter about the patient's anxiety and hoping for definitive treatment with medication    Review of Systems   Constitutional: Negative  HENT: Negative  Eyes: Negative  Respiratory: Negative  Cardiovascular: Negative  Gastrointestinal: Negative  Endocrine: Negative  Genitourinary: Negative  Musculoskeletal: Negative  Skin: Negative  Allergic/Immunologic: Negative  Neurological: Negative  Hematological: Negative  Psychiatric/Behavioral: Negative  Past Medical History:   Diagnosis Date   • Disease of thyroid gland      History reviewed  No pertinent surgical history  History reviewed  No pertinent family history    Social History     Socioeconomic History   • Marital status: /Civil Union     Spouse name: None   • Number of children: None   • Years of education: None   • Highest education level: None   Occupational History   • None   Tobacco Use   • Smoking status: Never   • Smokeless tobacco: Never   Vaping Use   • Vaping Use: Never used   Substance and Sexual Activity   • Alcohol use: Never   • Drug use: Never   • Sexual activity: None   Other Topics Concern   • None   Social History Narrative   • None     Social Determinants of Health     Financial Resource Strain: Not on file   Food Insecurity: Not on file   Transportation Needs: Not on file   Physical Activity: Not on file   Stress: Not on file   Social Connections: Not on file   Intimate Partner Violence: Not on file   Housing Stability: Not on file     Current Outpatient Medications on File Prior to Visit   Medication Sig   • Cholecalciferol (Vitamin D3) 50 MCG (2000 UT) capsule Take 1 capsule (2,000 Units total) by mouth daily   • omeprazole (PriLOSEC) 40 MG capsule Take 1 capsule (40 mg total) by mouth 2 (two) times a day   • HYDROcodone-acetaminophen (NORCO) 5-325 mg per tablet Take 1 tablet by mouth every 6 (six) hours as needed for pain for up to 15 doses Max Daily Amount: 4 tablets (Patient not taking: Reported on 10/31/2022)   • hydrOXYzine HCL (ATARAX) 25 mg tablet Take 1 tablet (25 mg total) by mouth every 6 (six) hours as needed for anxiety (Patient not taking: Reported on 8/22/2022)   • meclizine (ANTIVERT) 25 mg tablet Take 1 tablet (25 mg total) by mouth every 8 (eight) hours as needed for dizziness (Patient not taking: Reported on 8/22/2022)     No Known Allergies  Immunization History   Administered Date(s) Administered   • COVID-19 PFIZER VACCINE 0 3 ML IM 04/23/2021, 05/15/2021       Objective     /72 (BP Location: Left arm, Patient Position: Sitting, Cuff Size: Adult)   Pulse 75   Temp 98 °F (36 7 °C) (Tympanic)   Resp 19   Ht 5' (1 524 m)   Wt 77 6 kg (171 lb)   LMP 02/08/2022 (Approximate)   SpO2 97%   BMI 33 40 kg/m²     Physical Exam  Vitals and nursing note reviewed  Constitutional:       General: She is not in acute distress  Appearance: Normal appearance  She is obese  She is not ill-appearing, toxic-appearing or diaphoretic  Comments: Pleasant 20-year-old female who is awake alert in no acute distress  Accompanied by her daughter who is working as a    HENT:      Head: Normocephalic and atraumatic  Right Ear: Tympanic membrane, ear canal and external ear normal  There is no impacted cerumen        Left Ear: Tympanic membrane, ear canal and external ear normal  There is no impacted cerumen  Nose: Nose normal  No congestion or rhinorrhea  Mouth/Throat:      Mouth: Mucous membranes are moist       Pharynx: Oropharyngeal exudate (Some thick whitish postnasal drip) present  No posterior oropharyngeal erythema  Eyes:      General: No scleral icterus  Right eye: No discharge  Left eye: No discharge  Extraocular Movements: Extraocular movements intact  Conjunctiva/sclera: Conjunctivae normal       Pupils: Pupils are equal, round, and reactive to light  Neck:      Vascular: No carotid bruit  Cardiovascular:      Rate and Rhythm: Normal rate and regular rhythm  Pulses: Normal pulses  Heart sounds: Normal heart sounds  No murmur heard  No friction rub  No gallop  Pulmonary:      Effort: Pulmonary effort is normal  No respiratory distress  Breath sounds: Normal breath sounds  No stridor  No wheezing, rhonchi or rales  Chest:      Chest wall: No tenderness  Abdominal:      General: Abdomen is flat  Bowel sounds are normal  There is no distension  Palpations: Abdomen is soft  There is no mass  Tenderness: There is no abdominal tenderness  There is no right CVA tenderness, left CVA tenderness, guarding or rebound  Hernia: No hernia is present  Musculoskeletal:         General: No swelling, tenderness, deformity or signs of injury  Normal range of motion  Cervical back: Normal range of motion and neck supple  No rigidity or tenderness  Right lower leg: No edema  Left lower leg: No edema  Lymphadenopathy:      Cervical: No cervical adenopathy  Skin:     General: Skin is warm and dry  Capillary Refill: Capillary refill takes less than 2 seconds  Coloration: Skin is not jaundiced or pale  Findings: No bruising, erythema, lesion or rash  Neurological:      General: No focal deficit present        Mental Status: She is alert and oriented to person, place, and time  Mental status is at baseline  Cranial Nerves: No cranial nerve deficit  Sensory: No sensory deficit  Motor: No weakness  Coordination: Coordination normal       Gait: Gait normal       Deep Tendon Reflexes: Reflexes normal    Psychiatric:         Behavior: Behavior normal          Thought Content:  Thought content normal          Judgment: Judgment normal       Comments: Slightly anxious mood affect       Marty Almanzar DO

## 2022-12-05 ENCOUNTER — OFFICE VISIT (OUTPATIENT)
Dept: GASTROENTEROLOGY | Facility: CLINIC | Age: 47
End: 2022-12-05

## 2022-12-05 VITALS
DIASTOLIC BLOOD PRESSURE: 80 MMHG | TEMPERATURE: 97.7 F | SYSTOLIC BLOOD PRESSURE: 120 MMHG | HEIGHT: 60 IN | WEIGHT: 169 LBS | BODY MASS INDEX: 33.18 KG/M2

## 2022-12-05 DIAGNOSIS — R11.2 NAUSEA AND VOMITING, UNSPECIFIED VOMITING TYPE: ICD-10-CM

## 2022-12-05 DIAGNOSIS — K21.9 GASTROESOPHAGEAL REFLUX DISEASE WITHOUT ESOPHAGITIS: Primary | ICD-10-CM

## 2022-12-05 NOTE — PROGRESS NOTES
[facility] - Outpatient Follow-up Note  Yesi Rider 50 y o  female MRN: [id number]  Encounter: [id number]          ASSESSMENT AND PLAN:      1  Nausea/Vomiting  2  Epigastric Pain  3  Early Satiety    - Plan GES to r/o gastroparesis  - No response to several PPIs including current twice daily omeprazole  - OK to d/c ompeprazole for now   - will await results from GES - in the meantime, recommend small portions of low fat, low fiber foods to tolerance  - if this is normal, to consider treating for functional dyspepsia with amitriptyline at bedtime  ______________________________________________________________________    SUBJECTIVE:  Annetta Goncalves is a 50-year-olf Malagasy-speaking female who presents to the office for follow up  She continues to experience epigastric pain, post-prandially along with nausea and vomiting  Through translation support ptovided by her daughter, she states she will feel hungry, eat to the point of fullness and experience nausea with subsequent vomiting  She is frustrated by the persistent pain as well as a sense of early satiety  She has tried several PPIs and famotidine without relief  Her current course of omeprazole 40mg BID which she has been taking for the past month has not provided any relief  She was recently started on a low-dose antidepressant by her PCP  She states it made her feel terrible, like she couldn't get out of bed, so she discontinued this  She admits to having anxiety which is being managed by her PCP  REVIEW OF SYSTEMS IS OTHERWISE NEGATIVE  Historical Information   Past Medical History:   Diagnosis Date   • Disease of thyroid gland      History reviewed  No pertinent surgical history    Social History   Social History     Substance and Sexual Activity   Alcohol Use Never     Social History     Substance and Sexual Activity   Drug Use Never     Social History     Tobacco Use   Smoking Status Never   Smokeless Tobacco Never History reviewed  No pertinent family history  Meds/Allergies       Current Outpatient Medications:   •  Cholecalciferol (Vitamin D3) 50 MCG (2000 UT) capsule  •  omeprazole (PriLOSEC) 40 MG capsule  •  escitalopram (LEXAPRO) 5 mg tablet  •  HYDROcodone-acetaminophen (NORCO) 5-325 mg per tablet  •  hydrOXYzine HCL (ATARAX) 25 mg tablet  •  meclizine (ANTIVERT) 25 mg tablet    No Known Allergies        Objective     Blood pressure 120/80, temperature 97 7 °F (36 5 °C), temperature source Tympanic, height 5' (1 524 m), weight 76 7 kg (169 lb), last menstrual period 02/08/2022  Body mass index is 33 01 kg/m²  PHYSICAL EXAM:      General Appearance:   Alert, cooperative, no distress   HEENT:   Normocephalic, atraumatic, anicteric      Neck:  Supple, symmetrical, trachea midline   Lungs:   Respirations unlabored    Heart[de-identified]   Regular rate and rhythm; no murmur, rub, or gallop     Abdomen:   Soft, non-tender, non-distended; normal bowel sounds; no masses, no organomegaly    Genitalia:   Deferred    Rectal:   Deferred    Extremities:  No cyanosis, clubbing or edema    Pulses:  2+ and symmetric    Skin:  No jaundice, rashes, or lesions                  Milena Romero PA-C

## 2022-12-27 ENCOUNTER — TELEPHONE (OUTPATIENT)
Dept: NEUROLOGY | Facility: CLINIC | Age: 47
End: 2022-12-27

## 2022-12-30 PROBLEM — Z00.00 HEALTH CARE MAINTENANCE: Status: RESOLVED | Noted: 2022-10-31 | Resolved: 2022-12-30

## 2023-01-13 ENCOUNTER — HOSPITAL ENCOUNTER (OUTPATIENT)
Dept: RADIOLOGY | Facility: HOSPITAL | Age: 48
Discharge: HOME | End: 2023-01-13

## 2023-01-13 DIAGNOSIS — R11.2 NAUSEA AND VOMITING, UNSPECIFIED VOMITING TYPE: ICD-10-CM

## 2023-01-18 ENCOUNTER — TELEPHONE (OUTPATIENT)
Dept: NEUROLOGY | Facility: CLINIC | Age: 48
End: 2023-01-18

## 2023-01-23 ENCOUNTER — TELEPHONE (OUTPATIENT)
Dept: NEUROLOGY | Facility: CLINIC | Age: 48
End: 2023-01-23

## 2023-01-23 NOTE — TELEPHONE ENCOUNTER
LMOM requesting pt to bring images of CT on 3/9/15 from [address] [place] [address] to their next appt or have the images mailed to our office  I contacted [address] [place] [address] last week and was unable to obtain pt images as I did not have the location at which the images were taken

## 2023-01-25 ENCOUNTER — CONSULT (OUTPATIENT)
Dept: NEUROLOGY | Facility: CLINIC | Age: 48
End: 2023-01-25

## 2023-01-25 ENCOUNTER — TELEPHONE (OUTPATIENT)
Dept: NEUROLOGY | Facility: CLINIC | Age: 48
End: 2023-01-25

## 2023-01-25 VITALS
SYSTOLIC BLOOD PRESSURE: 140 MMHG | HEIGHT: 60 IN | TEMPERATURE: 97.6 F | HEART RATE: 63 BPM | WEIGHT: 173.6 LBS | DIASTOLIC BLOOD PRESSURE: 82 MMHG | OXYGEN SATURATION: 98 % | BODY MASS INDEX: 34.08 KG/M2

## 2023-01-25 DIAGNOSIS — G47.9 SLEEP DIFFICULTIES: ICD-10-CM

## 2023-01-25 DIAGNOSIS — R29.818 SUSPECTED SLEEP APNEA: ICD-10-CM

## 2023-01-25 DIAGNOSIS — G43.009 MIGRAINE WITHOUT AURA AND WITHOUT STATUS MIGRAINOSUS, NOT INTRACTABLE: Primary | ICD-10-CM

## 2023-01-25 DIAGNOSIS — F32.A ANXIETY AND DEPRESSION: ICD-10-CM

## 2023-01-25 DIAGNOSIS — F41.9 ANXIETY AND DEPRESSION: ICD-10-CM

## 2023-01-25 RX ORDER — AMITRIPTYLINE HYDROCHLORIDE 25 MG/1
25 TABLET, FILM COATED ORAL
Qty: 30 TABLET | Refills: 6 | Status: SHIPPED | OUTPATIENT
Start: 2023-01-25

## 2023-01-25 RX ORDER — AMITRIPTYLINE HYDROCHLORIDE 10 MG/1
TABLET, FILM COATED ORAL
Qty: 21 TABLET | Refills: 0 | Status: SHIPPED | OUTPATIENT
Start: 2023-01-25 | End: 2023-02-08

## 2023-01-25 RX ORDER — RIZATRIPTAN BENZOATE 10 MG/1
10 TABLET ORAL AS NEEDED
Qty: 10 TABLET | Refills: 3 | Status: SHIPPED | OUTPATIENT
Start: 2023-01-25

## 2023-01-25 NOTE — PROGRESS NOTES
[facility] Consult  PATIENT:  Nick Acharya  MRN:  [id number]  :  1975  DATE OF SERVICE:  2023  REFERRED BY: Sumi Hendrix DO  PMD: David Duarte DO    Assessment/Plan:     Nick Acharya is a delightful 50 y o  female with a past medical history that includes GERD, anxiety, hyperthyroidism referred here for evaluation of headache  Initial evaluation 2023     Ms Praveen Bergeron presents with a history of headaches for the last few years  Denies any history of headaches as a child or growing up  The description of her headaches sound like migraines, which is the likely diagnosis especially since her daughter reports that she also has a history of migraines  She has never been formally evaluated for them or treated for them  We discussed a variety of potential treatment options, but thought that amitriptyline would be a good choice given her comorbid anxiety/depression and difficulties with sleep  From an abortive standpoint I will have her try rizatriptan  I suspect she has multiple contributing components to her ongoing headaches, but I think sleep may be playing a significant role  She only sleeps for about 2 to 3 hours per night and I am also concerned about sleep apnea  I have recommended that she undergo a sleep study for further evaluation  Due to a recent increase in frequency/intensity of her headaches I would also like to obtain an MRI of the brain  I have asked her to make an appointment with her eye doctor for her yearly eye exam to ensure that there are no other issues causing her blurry vision  I have also recommended that she avoid driving when she has headaches if she feels that she cannot focus or feels unsafe  Migraine without aura and without status migrainosus, not intractable  -     MRI brain with and without contrast; Future  -     amitriptyline (ELAVIL) 10 mg tablet;  Take 1 tablet (10 mg total) by mouth daily at bedtime for 7 days, THEN 2 tablets (20 mg total) daily at bedtime for 7 days  -     amitriptyline (ELAVIL) 25 mg tablet; Take 1 tablet (25 mg total) by mouth daily at bedtime Start after tapering up for 2 weeks  -     rizatriptan (Maxalt) 10 mg tablet; Take 1 tablet (10 mg total) by mouth as needed for migraine Take at the onset of migraine; if symptoms continue or return, may take another dose at least 2 hours after first dose  Take no more than 2 doses in a day  Anxiety and depression  -     amitriptyline (ELAVIL) 10 mg tablet; Take 1 tablet (10 mg total) by mouth daily at bedtime for 7 days, THEN 2 tablets (20 mg total) daily at bedtime for 7 days  -     amitriptyline (ELAVIL) 25 mg tablet; Take 1 tablet (25 mg total) by mouth daily at bedtime Start after tapering up for 2 weeks    Suspected sleep apnea  -     Home Study; Future    Sleep difficulties  -     Home Study; Future  -     amitriptyline (ELAVIL) 10 mg tablet; Take 1 tablet (10 mg total) by mouth daily at bedtime for 7 days, THEN 2 tablets (20 mg total) daily at bedtime for 7 days  -     amitriptyline (ELAVIL) 25 mg tablet;  Take 1 tablet (25 mg total) by mouth daily at bedtime Start after tapering up for 2 weeks    Workup:  - Neurologic assessment reveals unremarkable neurological exam   - Due to increased frequency and severity of headaches and migraines I recommend further evaluation with MRI brain without contrast to rule out structural or treatable causes of symptoms  - Sleep study pending    Preventative:  - we discussed headache hygiene and lifestyle factors that may improve headaches  - Amitriptyline with goal 25mg HS  - Currently on through other providers: None  - Past/ failed/contraindicated: Lexapro (mood - did not tolerate)  - future options: SNRI, CGRP med, botox    Acute:  - discussed not taking over-the-counter or prescription pain medications more than 3 days per week to prevent medication overuse/rebound headache  - Rizatriptan 10mg  - Currently on through other providers: None  - Past/ failed/contraindicated: None  - future options:  Triptan, prochlorperazine, Toradol IM or p o , could consider trial of 5 days of Depakote 500 mg nightly or dexamethasone 2 mg daily for prolonged migraine, francis Jeffries nurtec  Patient instructions   Additional Testing:   Neurodiagnostic workup: MRI Brain ordered  - Sleep study    Headache Calendar  Please maintain a headache calendar  Consider using phone applications such as Migraine Ming or [email or web address] Migraine Tracker    Headache/migraine treatment:   Acute medications (for immediate treatment of a headache): It is ok to take ibuprofen, acetaminophen or naproxen (Advil, Tylenol,  Aleve, Excedrin) if they help your headaches you should limit these to No more than 2-3 times a week to avoid medication overuse/rebound headaches  For your more moderate to severe migraines take this medication early  Maxalt (rizatriptan) 10mg tabs - take one at the onset of headache  May repeat one time after 2 hours if pain has not resolved  (Max 2 a day and 10 a month)     Prescription preventive medications for headaches/migraines   (to take every day to help prevent headaches - not to take at the time of headache):  [x] Amitriptyline  Week 1: 1, 10mg tablet at bedtime (total 10mg)  Week 2: 2, 10mg tablets at bedtime (total 20mg)  Week 3: 1, 25mg tablet at bedtime (total 25mg)    *Typically these types of medications take time until you see the benefit, although some may see improvement in days, often it may take weeks, especially if the medication is being titrated up to a beneficial level  Please contact us if there are any concerns or questions regarding the medication  Lifestyle Recommendations:  [x] SLEEP - Maintain a regular sleep schedule: Adults need at least 7-8 hours of uninterrupted a night   Maintain good sleep hygiene:  Going to bed and waking up at consistent times, avoiding excessive daytime naps, avoiding caffeinated beverages in the evening, avoid excessive stimulation in the evening and generally using bed primarily for sleeping  One hour before bedtime would recommend turning lights down lower, decreasing your activity (may read quietly, listen to music at a low volume)  When you get into bed, should eliminate all technology (no texting, emailing, playing with your phone, iPad or tablet in bed)  [x] HYDRATION - Maintain good hydration  Drink  2L of fluid a day (4 typical small water bottles)  [x] DIET - Maintain good nutrition  In particular don't skip meals and try and eat healthy balanced meals regularly  [x] TRIGGERS - Look for other triggers and avoid them: Limit caffeine to 1-2 cups a day or less  Avoid dietary triggers that you have noticed bring on your headaches (this could include aged cheese, peanuts, MSG, aspartame and nitrates)  [x] EXERCISE - physical exercise as we all know is good for you in many ways, and not only is good for your heart, but also is beneficial for your mental health, cognitive health and  chronic pain/headaches  I would encourage at the least 5 days of physical exercise weekly for at least 30 minutes  Education and Follow-up  [x] Please call with any questions or concerns  Of course if any new concerning symptoms go to the emergency department  [x] Follow up in 6 months  CC: We had the pleasure of evaluating Amanda Pizano in neurological consultation today  Amanda Pizano is a  right handed female who presents today for evaluation of headaches  History obtained from patient as well as available medical record review  History of Present Illness:   Current medical illnesses  or past medical history include GERD, anxiety, hyperthyroidism    Pertinent history:  -Seen in internal medicine clinic on 10/31/2022 for headaches on a daily basis    Headaches started at what age?  53years old  How often do the headaches occur?   - as of 1/25/2023: 15/30  What time of the day do the headaches start? No particular time of day  How long do the headaches last? 4 hours if severe enough  Are you ever headache free? Yes    Aura? without aura     Where is your headache located and pain quality? Biparietal; throbbing  What is the intensity of pain? Worst 8/10  Associated symptoms:   [x] Nausea       [x] Vomiting  [x] Stiff or sore neck   [x] Photophobia     [x]Phonophobia  [x] Blurred vision   [x] Prefer quiet, dark room  [x] Dizzy     [x] Tinnitus   [x] N/T in hands (all the time)     Things that make the headache worse? No specific movements    Headache triggers: None    Have you seen someone else for headaches or pain? No  Have you had trigger point injection performed and how often? No  Have you had Botox injection performed and how often? No   Have you had epidural injections or transforaminal injections performed? No  Are you current pregnant or planning on getting pregnant? No  Have you ever had any Brain imaging? yes CT (possibly MRI many years ago, but unclear)    Last eye exam: 2 years ago - no appt scheduled yet (planning to)    What medications do you take or have you taken for your headaches?    ABORTIVE:    OTC medications: Motrin (does not help; 3-4 days per week)  Prescription: None    Past/ failed/contraindicated:  OTC medications: None  Prescription: None    PREVENTIVE:   None    Past/ failed/contraindicated:  None    LIFESTYLE  Sleep   - averages: about 2-3 hours per night  Problems falling asleep?:   Yes  Problems staying asleep?:  Yes  - Positive history of snoring    Physical activity: work is demanding    Water: 2 bottles per day  Caffeine: Espresso shots every once in a while; tea    Mood:  History of anxiety and depression  - Previously treated, but could not tolerate medications    The following portions of the patient's history were reviewed and updated as appropriate: allergies, current medications, past family history, past medical history, past social history, past surgical history and problem list     Pertinent family history:  Family history of headaches:  migraine headaches in daughter (daughter has a history of chiari malformation s/p decompression)  Any family history of aneurysms - No    Pertinent social history:  Work: Cleans houses  Education: 7th grade  Lives with  and daughter    Illicit Drugs: denies  Alcohol/tobacco: Denies alcohol use, Denies tobacco use  Past Medical History:     Past Medical History:   Diagnosis Date   • Disease of thyroid gland        Patient Active Problem List   Diagnosis   • Hyperthyroidism   • Vitamin D deficiency   • Gastroesophageal reflux disease without esophagitis   • New daily persistent headache   • Acquired pericardial cyst   • Psychogenic vomiting   • Anxiety       Medications:      Current Outpatient Medications   Medication Sig Dispense Refill   • Cholecalciferol (Vitamin D3) 50 MCG (2000 UT) capsule Take 1 capsule (2,000 Units total) by mouth daily     • omeprazole (PriLOSEC) 40 MG capsule Take 1 capsule (40 mg total) by mouth 2 (two) times a day 180 capsule 3   • escitalopram (LEXAPRO) 5 mg tablet Take 1 tablet (5 mg total) by mouth daily (Patient not taking: Reported on 12/5/2022) 30 tablet 5   • HYDROcodone-acetaminophen (NORCO) 5-325 mg per tablet Take 1 tablet by mouth every 6 (six) hours as needed for pain for up to 15 doses Max Daily Amount: 4 tablets (Patient not taking: Reported on 10/31/2022) 15 tablet 0   • hydrOXYzine HCL (ATARAX) 25 mg tablet Take 1 tablet (25 mg total) by mouth every 6 (six) hours as needed for anxiety (Patient not taking: Reported on 8/22/2022) 12 tablet 0   • meclizine (ANTIVERT) 25 mg tablet Take 1 tablet (25 mg total) by mouth every 8 (eight) hours as needed for dizziness (Patient not taking: Reported on 8/22/2022) 30 tablet 0     No current facility-administered medications for this visit  Allergies:    No Known Allergies    Family History:     History reviewed   No pertinent family history  Social History:       Social History     Socioeconomic History   • Marital status: /Civil Union     Spouse name: Not on file   • Number of children: Not on file   • Years of education: Not on file   • Highest education level: Not on file   Occupational History   • Not on file   Tobacco Use   • Smoking status: Never   • Smokeless tobacco: Never   Vaping Use   • Vaping Use: Never used   Substance and Sexual Activity   • Alcohol use: Never   • Drug use: Never   • Sexual activity: Not on file   Other Topics Concern   • Not on file   Social History Narrative   • Not on file     Social Determinants of Health     Financial Resource Strain: Not on file   Food Insecurity: Not on file   Transportation Needs: Not on file   Physical Activity: Not on file   Stress: Not on file   Social Connections: Not on file   Intimate Partner Violence: Not on file   Housing Stability: Not on file         Objective:   Physical Exam:                                                                 Vitals:            Constitutional:    /82 (BP Location: Left arm, Patient Position: Sitting, Cuff Size: Standard)   Pulse 63   Temp 97 6 °F (36 4 °C) (Temporal)   Ht 5' (1 524 m)   Wt 78 7 kg (173 lb 9 6 oz)   LMP 02/08/2022 (Approximate)   SpO2 98%   BMI 33 90 kg/m²   BP Readings from Last 3 Encounters:   01/25/23 140/82   12/05/22 120/80   11/18/22 130/72     Pulse Readings from Last 3 Encounters:   01/25/23 63   11/18/22 75   10/31/22 88         Well developed, well nourished, well groomed  No dysmorphic features  HEENT:  Normocephalic atraumatic  Oropharynx is clear and moist  No oral mucosal lesions  Chest:  Respirations regular and unlabored  Cardiovascular:  Distal extremities warm without palpable edema or tenderness, no observed significant swelling      Musculoskeletal:  (see below under neurologic exam for evaluation of motor function and gait)   Skin:  warm and dry, not diaphoretic  No apparent birthmarks or stigmata of neurocutaneous disease  Psychiatric:  Normal behavior and appropriate affect       Neurological Examination:     Mental status/cognitive function:   Orientated to time, place and person  Recent and remote memory intact  Attention span and concentration as well as fund of knowledge are appropriate for age  Normal language and spontaneous speech  Cranial Nerves:  II-visual fields full  Fundi poorly visualized due to pupillary constriction  III, IV, VI-Pupils were equal, round, and reactive to light and accomodation  Extraocular movements were full and conjugate without nystagmus  Conjugate gaze, normal smooth pursuits, normal saccades   V-facial sensation symmetric  VII-facial expression symmetric, intact forehead wrinkle, strong eye closure, symmetric smile    VIII-hearing grossly intact bilaterally   IX, X-palate elevation symmetric, no dysarthria  XI-shoulder shrug strength intact    XII-tongue protrusion midline  Motor Exam: symmetric bulk and tone throughout, no pronator drift  Power/strength 5/5 bilateral upper and lower extremities, no atrophy, fasciculations or abnormal movements noted  Sensory: grossly intact light touch in all extremities  Reflexes: brachioradialis 2+, biceps 2+, knee 2+, ankle 2+ bilaterally  No ankle clonus  Coordination: Finger nose finger intact bilaterally, no apparent dysmetria, ataxia or tremor noted  Gait: steady casual and tandem gait  Pertinent lab results: None     Pertinent Imaging:   -CT head without contrast March 2015: Unremarkable imaging  I have personally reviewed radiology read- imaging not available in system  Review of Systems:   Constitutional: Negative  Negative for appetite change and fever  HENT: Negative  Negative for hearing loss, tinnitus, trouble swallowing and voice change  Eyes: Positive for visual disturbance (blurred)  Negative for photophobia and pain     Respiratory: Negative  Negative for shortness of breath  Cardiovascular: Negative  Negative for palpitations  Gastrointestinal: Negative for nausea and vomiting  Endocrine: Negative  Negative for cold intolerance  Genitourinary: Negative  Negative for dysuria, frequency and urgency  Musculoskeletal: Negative  Negative for gait problem, myalgias and neck pain  Skin: Negative  Negative for rash  Allergic/Immunologic: Negative  Neurological: Positive for dizziness and headaches  Negative for tremors, seizures, syncope, facial asymmetry, speech difficulty, weakness, light-headedness and numbness  Hematological: Negative  Does not bruise/bleed easily  Psychiatric/Behavioral: Negative  Negative for confusion, hallucinations and sleep disturbance  All other systems reviewed and are negative  I have spent 45 minutes with the patient today in which greater than 50% of this time was spent in counseling/coordination of care regarding Prognosis, Risks and benefits of tx options, Patient and family education and Impressions  I also spent 20 minutes non face to face for this patient the same day       Activity Minutes   Precharting/reviewing 10   Patient care/counseling  45   Postcharting/care coordination 10       Author:  Jermaine Mohan DO 1/25/2023 12:50 PM

## 2023-01-25 NOTE — PROGRESS NOTES
Review of Systems   Constitutional: Negative  Negative for appetite change and fever  HENT: Negative  Negative for hearing loss, tinnitus, trouble swallowing and voice change  Eyes: Positive for visual disturbance (blurred)  Negative for photophobia and pain  Respiratory: Negative  Negative for shortness of breath  Cardiovascular: Negative  Negative for palpitations  Gastrointestinal: Negative for nausea and vomiting  Endocrine: Negative  Negative for cold intolerance  Genitourinary: Negative  Negative for dysuria, frequency and urgency  Musculoskeletal: Negative  Negative for gait problem, myalgias and neck pain  Skin: Negative  Negative for rash  Allergic/Immunologic: Negative  Neurological: Positive for dizziness and headaches  Negative for tremors, seizures, syncope, facial asymmetry, speech difficulty, weakness, light-headedness and numbness  Hematological: Negative  Does not bruise/bleed easily  Psychiatric/Behavioral: Negative  Negative for confusion, hallucinations and sleep disturbance  All other systems reviewed and are negative

## 2023-01-25 NOTE — PATIENT INSTRUCTIONS
Additional Testing:   Neurodiagnostic workup: MRI Brain ordered  - Sleep study    Headache Calendar  Please maintain a headache calendar  Consider using phone applications such as Migraine Ming or [employer] Migraine Tracker    Headache/migraine treatment:   Acute medications (for immediate treatment of a headache): It is ok to take ibuprofen, acetaminophen or naproxen (Advil, Tylenol,  Aleve, Excedrin) if they help your headaches you should limit these to No more than 2-3 times a week to avoid medication overuse/rebound headaches  For your more moderate to severe migraines take this medication early  Maxalt (rizatriptan) 10mg tabs - take one at the onset of headache  May repeat one time after 2 hours if pain has not resolved  (Max 2 a day and 10 a month)     Prescription preventive medications for headaches/migraines   (to take every day to help prevent headaches - not to take at the time of headache):  [x] Amitriptyline  Week 1: 1, 10mg tablet at bedtime (total 10mg)  Week 2: 2, 10mg tablets at bedtime (total 20mg)  Week 3: 1, 25mg tablet at bedtime (total 25mg)    *Typically these types of medications take time until you see the benefit, although some may see improvement in days, often it may take weeks, especially if the medication is being titrated up to a beneficial level  Please contact us if there are any concerns or questions regarding the medication  Lifestyle Recommendations:  [x] SLEEP - Maintain a regular sleep schedule: Adults need at least 7-8 hours of uninterrupted a night  Maintain good sleep hygiene:  Going to bed and waking up at consistent times, avoiding excessive daytime naps, avoiding caffeinated beverages in the evening, avoid excessive stimulation in the evening and generally using bed primarily for sleeping  One hour before bedtime would recommend turning lights down lower, decreasing your activity (may read quietly, listen to music at a low volume)   When you get into bed, should eliminate all technology (no texting, emailing, playing with your phone, iPad or tablet in bed)  [x] HYDRATION - Maintain good hydration  Drink  2L of fluid a day (4 typical small water bottles)  [x] DIET - Maintain good nutrition  In particular don't skip meals and try and eat healthy balanced meals regularly  [x] TRIGGERS - Look for other triggers and avoid them: Limit caffeine to 1-2 cups a day or less  Avoid dietary triggers that you have noticed bring on your headaches (this could include aged cheese, peanuts, MSG, aspartame and nitrates)  [x] EXERCISE - physical exercise as we all know is good for you in many ways, and not only is good for your heart, but also is beneficial for your mental health, cognitive health and  chronic pain/headaches  I would encourage at the least 5 days of physical exercise weekly for at least 30 minutes  Education and Follow-up  [x] Please call with any questions or concerns  Of course if any new concerning symptoms go to the emergency department    [x] Follow up in 6 months

## 2023-01-27 ENCOUNTER — TELEPHONE (OUTPATIENT)
Dept: SLEEP CENTER | Facility: CLINIC | Age: 48
End: 2023-01-27

## 2023-01-27 NOTE — TELEPHONE ENCOUNTER
----- Message from Dedrick Deleon MD sent at 1/26/2023  2:06 PM EST -----  Approved    ----- Message -----  From: Tori Grace  Sent: 1/26/2023   8:15 AM EST  To: Sleep Medicine [facility] Provider    This home sleep study needs approval      If approved please sign and return to clerical pool  If denied please include reasons why  Also provide alternative testing if warranted  Please sign and return to clerical pool

## 2023-02-09 ENCOUNTER — OFFICE VISIT (OUTPATIENT)
Dept: GASTROENTEROLOGY | Facility: CLINIC | Age: 48
End: 2023-02-09

## 2023-02-09 VITALS
WEIGHT: 169 LBS | HEIGHT: 60 IN | BODY MASS INDEX: 33.18 KG/M2 | DIASTOLIC BLOOD PRESSURE: 81 MMHG | HEART RATE: 77 BPM | SYSTOLIC BLOOD PRESSURE: 130 MMHG | TEMPERATURE: 98.1 F

## 2023-02-09 DIAGNOSIS — Z12.11 COLON CANCER SCREENING: ICD-10-CM

## 2023-02-09 DIAGNOSIS — R10.13 EPIGASTRIC PAIN: Primary | ICD-10-CM

## 2023-02-09 NOTE — PROGRESS NOTES
[facility] [address] [place] Visit Note  Diane Harrison 50 y o  female   MRN: [id number]    Assessment and Plan      Diagnoses and all orders for this visit:    Epigastric pain  -     US right upper quadrant with liver dopplers; Future  -     Ambulatory Referral to General Surgery; Future  Colon cancer screening  -     Colonoscopy; Future  -     polyethylene glycol (GOLYTELY) 4000 mL solution; Take 4,000 mL by mouth once for 1 dose  Other orders  -     Diet NPO; Sips with meds; Standing  -     Void on call to OR; Standing  -     Insert peripheral IV; Standing      Epigastric pain: Reporting of longstanding history of epigastric pain with associated nausea and vomiting  She was also seen in the clinic for similar symptoms last time  Been trialed on multiple PPI therapies and her pain has remained refractory  At the last clinic visit, patient was advised to follow-up on a NM gastric study scan as well as do a short trial of low-dose amitriptyline  At today's visit, we discussed the results of NM gastric study which was normal and patient reporting that despite taking low-dose amitriptyline, her symptoms have persisted  She continues to have constant vomiting usually 5-10 minutes after ingesting food  She denies any bloody emesis or bilious emesis  Denies any weight loss  While this may be functional dyspepsia, patient did have a bedside ultrasound in the ED that showed cholelithiasis  On chart review, patient has not had a formal right upper quadrant ultrasound which is ordered at today's visit to rule out cholelithiasis  Patient was also provided with a referral to surgery for possible cholecystectomy if needed    -Advised to follow-up on right upper quadrant ultrasound as well as surgical referral  -Discussed small quantity, increased frequency meals as tolerated by patient  -Patient advised to take amitriptyline 25 mg instead of 10 mg daily  -Follow-up with the patient after the ultrasound study and colonoscopy    Screening colonoscopy: Patient with history of 1 prior colonoscopy about 7-8 years ago that was done because of family history of colon cancer as reported by the patient  Results currently unavailable on chart review but patient reports that the results were otherwise normal   Discussed with the patient risks and benefits of getting a colonoscopy at this point and patient is agreeable to this   -Colonoscopy ordered at this visit    BMI Counseling: Body mass index is 33 01 kg/m²  The BMI is above normal  Nutrition recommendations include reducing portion sizes, decreasing overall calorie intake, consuming healthier snacks and moderation in carbohydrate intake  Exercise recommendations include exercising 3-5 times per week  Subjective     History of Present Illness:  Patient is a 57-year-old female with history of hypothyroidism who is previously been seen in the GI clinic for concerns for gastritis  Based on chart review, patient underwent EGD that showed small hiatal hernia, with biopsies that were negative for H  pylori  Patient continued to have worsening epigastric pain and has been trialed on multiple PPI therapies in the past and has remained refractory to this  At previous visit, patient was advised to do a trial of amitriptyline to see if there is improvement in symptoms  This visit, patient reports that she has been taking omeprazole and amitriptyline and continues to have refractory symptoms  Continues to have epigastric pain as well as constant vomiting  She denies any weight loss or blood in her emesis  As per the patient, she typically feels nauseated about 5-10 minutes after having food  We spoke with the patient about history of a bedside ultrasound done in the ED that showed cholelithiasis    I informed her that it is possible that her symptoms could be because of cholelithiasis and therefore she should get a formal right upper quadrant ultrasound and patient is agreeable to this  A surgical referral for possible cholecystectomy was also provided to the patient  Patient is also interested in a screening colonoscopy which is ordered at today's visit  Advised to follow-up on the imaging study and we will see her after the colonoscopy  Other concerns at this time  Review of Systems   Constitutional: Positive for activity change  Negative for chills and fever  Gastrointestinal: Positive for abdominal pain, nausea and vomiting  Negative for blood in stool, constipation and diarrhea  Current Outpatient Medications:   •  amitriptyline (ELAVIL) 25 mg tablet, Take 1 tablet (25 mg total) by mouth daily at bedtime Start after tapering up for 2 weeks, Disp: 30 tablet, Rfl: 6  •  Cholecalciferol (Vitamin D3) 50 MCG (2000 UT) capsule, Take 1 capsule (2,000 Units total) by mouth daily, Disp: , Rfl:   •  omeprazole (PriLOSEC) 40 MG capsule, Take 1 capsule (40 mg total) by mouth 2 (two) times a day, Disp: 180 capsule, Rfl: 3  •  polyethylene glycol (GOLYTELY) 4000 mL solution, Take 4,000 mL by mouth once for 1 dose, Disp: 4000 mL, Rfl: 0  •  rizatriptan (Maxalt) 10 mg tablet, Take 1 tablet (10 mg total) by mouth as needed for migraine Take at the onset of migraine; if symptoms continue or return, may take another dose at least 2 hours after first dose  Take no more than 2 doses in a day , Disp: 10 tablet, Rfl: 3  •  amitriptyline (ELAVIL) 10 mg tablet, Take 1 tablet (10 mg total) by mouth daily at bedtime for 7 days, THEN 2 tablets (20 mg total) daily at bedtime for 7 days  , Disp: 21 tablet, Rfl: 0  No Known Allergies  Past Medical History:   Diagnosis Date   • Disease of thyroid gland      History reviewed  No pertinent surgical history  History reviewed  No pertinent family history    Social History     Substance and Sexual Activity   Alcohol Use Never     Social History     Substance and Sexual Activity   Drug Use Never     Social History     Tobacco Use   Smoking Status Never   Smokeless Tobacco Never       Objective     Vitals:    02/09/23 1427   BP: 130/81   BP Location: Right arm   Patient Position: Sitting   Cuff Size: Large   Pulse: 77   Temp: 98 1 °F (36 7 °C)   TempSrc: Tympanic   Weight: 76 7 kg (169 lb)   Height: 5' (1 524 m)       Physical Exam  Vitals reviewed  Constitutional:       Appearance: She is obese  HENT:      Head: Normocephalic  Cardiovascular:      Rate and Rhythm: Normal rate and regular rhythm  Pulses: Normal pulses  Heart sounds: Normal heart sounds  Pulmonary:      Effort: Pulmonary effort is normal       Breath sounds: Normal breath sounds  Abdominal:      General: Bowel sounds are normal       Palpations: Abdomen is soft  Comments: Mild to moderate epigastric tenderness to palpation, no rebound or guarding   Skin:     General: Skin is warm and dry  Capillary Refill: Capillary refill takes less than 2 seconds  Neurological:      Mental Status: She is alert and oriented to person, place, and time  Care Time Delivered:   I have spent 30 minutes with patient today in which greater than 50% of this time was spent in counseling/coordination of care  Madiha Mandujano MD  Internal Medicine Residency PGY-2  [place]    ==  PLEASE NOTE:  This encounter was completed utilizing the [employer] Voice Recognition Software  Grammatical errors, random word insertions, pronoun errors and incomplete sentences are occasional consequences of the system due to software limitations, ambient noise and hardware issues  These may be missed by proof reading prior to affixing electronic signature  Any questions or concerns about the content, text or information contained within the body of this dictation should be directly addressed to the physician for clarification   Please do not hesitate to call me directly if you have any any questions or concerns

## 2023-02-09 NOTE — PATIENT INSTRUCTIONS
Scheduled date of colonoscopy (as of today):  4/27/23  Physician performing colonoscopy: Dr Thea Gomez  Location of colonoscopy: [facility]   Bowel prep reviewed with patient: alem/dulcolax  Instructions reviewed with patient by: Loretta Booker   Clearances:   n/a

## 2023-02-14 ENCOUNTER — HOSPITAL ENCOUNTER (OUTPATIENT)
Dept: RADIOLOGY | Facility: HOSPITAL | Age: 48
Discharge: HOME | End: 2023-02-14
Attending: STUDENT IN AN ORGANIZED HEALTH CARE EDUCATION/TRAINING PROGRAM

## 2023-02-14 DIAGNOSIS — G43.009 MIGRAINE WITHOUT AURA AND WITHOUT STATUS MIGRAINOSUS, NOT INTRACTABLE: ICD-10-CM

## 2023-02-14 RX ADMIN — GADOBUTROL 7 ML: 604.72 INJECTION INTRAVENOUS at 18:47

## 2023-03-01 ENCOUNTER — OFFICE VISIT (OUTPATIENT)
Dept: ENDOCRINOLOGY | Facility: CLINIC | Age: 48
End: 2023-03-01

## 2023-03-01 VITALS
HEART RATE: 80 BPM | SYSTOLIC BLOOD PRESSURE: 130 MMHG | TEMPERATURE: 97.6 F | WEIGHT: 171 LBS | DIASTOLIC BLOOD PRESSURE: 82 MMHG | HEIGHT: 60 IN | BODY MASS INDEX: 33.57 KG/M2

## 2023-03-01 DIAGNOSIS — E55.9 VITAMIN D DEFICIENCY: ICD-10-CM

## 2023-03-01 DIAGNOSIS — R94.6 ABNORMAL THYROID FUNCTION TEST: Primary | ICD-10-CM

## 2023-03-01 DIAGNOSIS — R76.8 THYROID ANTIBODY POSITIVE: ICD-10-CM

## 2023-03-01 RX ORDER — ACETAMINOPHEN 160 MG
3000 TABLET,DISINTEGRATING ORAL DAILY
Start: 2023-03-01

## 2023-03-01 NOTE — PROGRESS NOTES
Patient Progress Note    CC: hyperthyroidism  Patient is here with her daughter who helped with translation    Referring Provider  No referring provider defined for this encounter  History of Present Illness:     Patient is a 47-year-old female here for follow-up of hyperthyroidism and vitamin-D deficiency  In December 2021 she was noted to have low TSH is 0 259 and free T4 1  12  Thyroid antibodies are positive but thyroid-stimulating immunoglobulin and thyrotropin receptor antibody were previously negative  Repeat TSH in April 2022 was normal at 3 3 and free T4 was normal at 1 2  She is not on thyroid medication at this time  She does have a sister who has thyroid disorder  No history of external radiation to head/neck/chest  No family history of thyroid cancer  No recent Iodine loading in form of medication, biotin or kelp supplements or radiological diagnostic studies  Thyroid ultrasound done in October 2021 did not show nodules  Most recent thyroid labs done in October 2022 were normal, TSH 2 4 and free T4 1 1  Vitamin-D previously low at 27 8 but improved from 21 2  She has is taking vitamin D3 2000 International Units recently  Patient Active Problem List   Diagnosis   • Hyperthyroidism   • Vitamin D deficiency   • Gastroesophageal reflux disease without esophagitis   • New daily persistent headache   • Acquired pericardial cyst   • Psychogenic vomiting   • Anxiety   • Migraine without aura and without status migrainosus, not intractable   • Abnormal thyroid function test   • Thyroid antibody positive     Past Medical History:   Diagnosis Date   • Disease of thyroid gland       History reviewed  No pertinent surgical history  History reviewed  No pertinent family history    Social History     Tobacco Use   • Smoking status: Never   • Smokeless tobacco: Never   Substance Use Topics   • Alcohol use: Never     No Known Allergies  Current Outpatient Medications   Medication Sig Dispense Refill • amitriptyline (ELAVIL) 25 mg tablet Take 1 tablet (25 mg total) by mouth daily at bedtime Start after tapering up for 2 weeks 30 tablet 6   • Cholecalciferol (Vitamin D3) 50 MCG (2000 UT) capsule Take 1 5 capsules (3,000 Units total) by mouth daily     • omeprazole (PriLOSEC) 40 MG capsule Take 1 capsule (40 mg total) by mouth 2 (two) times a day 180 capsule 3   • rizatriptan (Maxalt) 10 mg tablet Take 1 tablet (10 mg total) by mouth as needed for migraine Take at the onset of migraine; if symptoms continue or return, may take another dose at least 2 hours after first dose  Take no more than 2 doses in a day  10 tablet 3   • amitriptyline (ELAVIL) 10 mg tablet Take 1 tablet (10 mg total) by mouth daily at bedtime for 7 days, THEN 2 tablets (20 mg total) daily at bedtime for 7 days  21 tablet 0   • polyethylene glycol (GOLYTELY) 4000 mL solution Take 4,000 mL by mouth once for 1 dose 4000 mL 0     No current facility-administered medications for this visit  Review of Systems   Constitutional: Positive for fatigue (just the past few days but it tends to occur when she has headaches / migraines)  Negative for activity change, appetite change and unexpected weight change  HENT: Negative for trouble swallowing  Eyes: Negative for visual disturbance  Respiratory: Negative for shortness of breath  Cardiovascular: Positive for palpitations (occasional)  Negative for chest pain  Gastrointestinal: Negative for constipation and diarrhea  Endocrine: Negative for cold intolerance and heat intolerance  Musculoskeletal: Positive for arthralgias  Skin: Negative  Neurological: Positive for headaches (intermittent, migraines  Saw neurology  )  Negative for tremors  Psychiatric/Behavioral: The patient is nervous/anxious  Physical Exam:  Body mass index is 33 4 kg/m²    /82   Pulse 80   Temp 97 6 °F (36 4 °C) (Skin)   Ht 5' (1 524 m)   Wt 77 6 kg (171 lb)   LMP 02/08/2022 (Approximate) BMI 33 40 kg/m²    Wt Readings from Last 3 Encounters:   03/01/23 77 6 kg (171 lb)   02/09/23 76 7 kg (169 lb)   01/25/23 78 7 kg (173 lb 9 6 oz)       Physical Exam  Vitals and nursing note reviewed  Constitutional:       Appearance: She is well-developed  HENT:      Head: Normocephalic  Eyes:      General: No scleral icterus  Pupils: Pupils are equal, round, and reactive to light  Neck:      Thyroid: No thyromegaly  Cardiovascular:      Rate and Rhythm: Normal rate and regular rhythm  Pulses:           Radial pulses are 2+ on the right side and 2+ on the left side  Heart sounds: No murmur heard  Pulmonary:      Effort: Pulmonary effort is normal  No respiratory distress  Breath sounds: Normal breath sounds  No wheezing  Musculoskeletal:      Cervical back: Neck supple  Skin:     General: Skin is warm and dry  Neurological:      Mental Status: She is alert  Deep Tendon Reflexes: Reflexes are normal and symmetric  Patient's shoes and socks were not removed            Labs:   No results found for: HGBA1C    No results found for: CHOL, HDL, TRIG, CHOLHDL    Lab Results   Component Value Date    CALCIUM 9 1 09/20/2022    K 3 9 09/20/2022    CO2 29 09/20/2022     09/20/2022    BUN 19 09/20/2022    CREATININE 0 67 09/20/2022        eGFR   Date Value Ref Range Status   09/20/2022 103 ml/min/1 73sq m Final       Lab Results   Component Value Date    ALT 23 09/20/2022    AST 32 09/20/2022    ALKPHOS 82 09/20/2022       Lab Results   Component Value Date    PAU9BMLNXYGC 0 259 (L) 12/08/2021    TSH 2 4 10/14/2022         Plan:    Diagnoses and all orders for this visit:    Abnormal thyroid function test / Thyroid antibody positive  History of subclinical hyperthyroidism  Most recent TFTs are normal, TSH 2 4 and free T4 1 1  Not on thyroid medicine  Positive thyroid antibodies means she is at risk for hypothyroidism  Continue to monitor thyroid function test and call for signs of hypothyroidism  -     T4, free; Future  -     TSH, 3rd generation; Future      Vitamin D deficiency  Vitamin D is improved but remains low at 27 8  Increase vitamin D3 to 3000 IU daily  -     Vitamin D 25 hydroxy; Future  -     Cholecalciferol (Vitamin D3) 50 MCG (2000 UT) capsule; Take 1 5 capsules (3,000 Units total) by mouth daily            Discussed with the patient and all questions fully answered  She will call me if any problems arise      Counseled patient on diagnostic results, prognosis, risk and benefit of treatment options, instruction for management, importance of treatment compliance, risk  factor reduction and impressions      Maryan Perea PA-C

## 2023-03-22 ENCOUNTER — HOSPITAL ENCOUNTER (OUTPATIENT)
Dept: RADIOLOGY | Facility: HOSPITAL | Age: 48
Discharge: HOME | End: 2023-03-22

## 2023-03-22 DIAGNOSIS — R10.13 EPIGASTRIC PAIN: ICD-10-CM

## 2023-04-27 ENCOUNTER — ANESTHESIA (OUTPATIENT)
Dept: GASTROENTEROLOGY | Facility: HOSPITAL | Age: 48
End: 2023-04-27

## 2023-04-27 ENCOUNTER — ANESTHESIA EVENT (OUTPATIENT)
Dept: GASTROENTEROLOGY | Facility: HOSPITAL | Age: 48
End: 2023-04-27

## 2023-04-27 ENCOUNTER — HOSPITAL ENCOUNTER (OUTPATIENT)
Dept: GASTROENTEROLOGY | Facility: HOSPITAL | Age: 48
Setting detail: OUTPATIENT SURGERY
Discharge: HOME | End: 2023-04-27
Attending: INTERNAL MEDICINE

## 2023-04-27 VITALS
RESPIRATION RATE: 17 BRPM | OXYGEN SATURATION: 100 % | SYSTOLIC BLOOD PRESSURE: 123 MMHG | DIASTOLIC BLOOD PRESSURE: 63 MMHG | TEMPERATURE: 98.4 F | HEART RATE: 85 BPM

## 2023-04-27 DIAGNOSIS — K21.9 GASTROESOPHAGEAL REFLUX DISEASE WITHOUT ESOPHAGITIS: ICD-10-CM

## 2023-04-27 DIAGNOSIS — Z12.11 COLON CANCER SCREENING: ICD-10-CM

## 2023-04-27 LAB
EXT PREGNANCY TEST URINE: NEGATIVE
EXT. CONTROL: NORMAL

## 2023-04-27 RX ORDER — SODIUM CHLORIDE 9 MG/ML
INJECTION, SOLUTION INTRAVENOUS CONTINUOUS PRN
Status: DISCONTINUED | OUTPATIENT
Start: 2023-04-27 | End: 2023-04-27

## 2023-04-27 RX ORDER — GLYCOPYRROLATE 0.2 MG/ML
INJECTION INTRAMUSCULAR; INTRAVENOUS AS NEEDED
Status: DISCONTINUED | OUTPATIENT
Start: 2023-04-27 | End: 2023-04-27

## 2023-04-27 RX ORDER — PROPOFOL 10 MG/ML
INJECTION, EMULSION INTRAVENOUS CONTINUOUS PRN
Status: DISCONTINUED | OUTPATIENT
Start: 2023-04-27 | End: 2023-04-27

## 2023-04-27 RX ORDER — PROPOFOL 10 MG/ML
INJECTION, EMULSION INTRAVENOUS AS NEEDED
Status: DISCONTINUED | OUTPATIENT
Start: 2023-04-27 | End: 2023-04-27

## 2023-04-27 RX ORDER — LIDOCAINE HYDROCHLORIDE 10 MG/ML
INJECTION, SOLUTION EPIDURAL; INFILTRATION; INTRACAUDAL; PERINEURAL AS NEEDED
Status: DISCONTINUED | OUTPATIENT
Start: 2023-04-27 | End: 2023-04-27

## 2023-04-27 RX ADMIN — LIDOCAINE HYDROCHLORIDE 50 MG: 10 INJECTION, SOLUTION EPIDURAL; INFILTRATION; INTRACAUDAL; PERINEURAL at 13:55

## 2023-04-27 RX ADMIN — PROPOFOL 40 MG: 10 INJECTION, EMULSION INTRAVENOUS at 13:56

## 2023-04-27 RX ADMIN — PROPOFOL 120 MCG/KG/MIN: 10 INJECTION, EMULSION INTRAVENOUS at 13:55

## 2023-04-27 RX ADMIN — PROPOFOL 20 MG: 10 INJECTION, EMULSION INTRAVENOUS at 13:58

## 2023-04-27 RX ADMIN — PROPOFOL 40 MG: 10 INJECTION, EMULSION INTRAVENOUS at 14:04

## 2023-04-27 RX ADMIN — GLYCOPYRROLATE 0.1 MG: 0.2 INJECTION, SOLUTION INTRAMUSCULAR; INTRAVENOUS at 13:57

## 2023-04-27 RX ADMIN — PROPOFOL 20 MG: 10 INJECTION, EMULSION INTRAVENOUS at 14:07

## 2023-04-27 RX ADMIN — PROPOFOL 40 MG: 10 INJECTION, EMULSION INTRAVENOUS at 14:01

## 2023-04-27 RX ADMIN — PROPOFOL 80 MG: 10 INJECTION, EMULSION INTRAVENOUS at 13:55

## 2023-04-27 RX ADMIN — SODIUM CHLORIDE: 9 INJECTION, SOLUTION INTRAVENOUS at 13:46

## 2023-04-27 NOTE — ANESTHESIA PREPROCEDURE EVALUATION
Procedure:  COLONOSCOPY    Relevant Problems   CARDIO   (+) Migraine without aura and without status migrainosus, not intractable      ENDO   (+) Hyperthyroidism      GI/HEPATIC   (+) Gastroesophageal reflux disease without esophagitis      NEURO/PSYCH   (+) Anxiety   (+) Migraine without aura and without status migrainosus, not intractable   (+) New daily persistent headache        Physical Exam    Airway    Mallampati score: II  TM Distance: >3 FB  Neck ROM: full     Dental   No notable dental hx     Cardiovascular      Pulmonary      Other Findings        Anesthesia Plan  ASA Score- 2     Anesthesia Type- IV sedation with anesthesia with ASA Monitors  Additional Monitors:   Airway Plan:           Plan Factors-Exercise tolerance (METS): >4 METS  Chart reviewed  Patient summary reviewed  Patient is not a current smoker  Induction- intravenous  Postoperative Plan-     Informed Consent- Anesthetic plan and risks discussed with patient  I personally reviewed this patient with the CRNA  Discussed and agreed on the Anesthesia Plan with the CRNA  Krystin Alexandre

## 2023-04-27 NOTE — ANESTHESIA POSTPROCEDURE EVALUATION
Post-Op Assessment Note    CV Status:  Stable  Pain Score: 0    Pain management: adequate     Mental Status:  Sleepy   Hydration Status:  Euvolemic   PONV Controlled:  Controlled   Airway Patency:  Patent      Post Op Vitals Reviewed: Yes      Staff: CRNA         No notable events documented      BP   135/65   Temp   98 4   Pulse  79   Resp   18   SpO2   99%

## 2023-06-13 ENCOUNTER — HOSPITAL ENCOUNTER (OUTPATIENT)
Dept: SLEEP CENTER | Facility: CLINIC | Age: 48
Discharge: HOME | End: 2023-06-13
Payer: COMMERCIAL

## 2023-06-13 DIAGNOSIS — G47.9 SLEEP DIFFICULTIES: ICD-10-CM

## 2023-06-13 DIAGNOSIS — R29.818 SUSPECTED SLEEP APNEA: ICD-10-CM

## 2023-06-13 PROCEDURE — G0399 HOME SLEEP TEST/TYPE 3 PORTA: HCPCS

## 2023-06-15 NOTE — PROGRESS NOTES
Home Sleep Study Documentation    HOME STUDY DEVICE: Noxturnal no                                           Susanna G3 yes      Pre-Sleep Home Study:    Set-up and instructions performed by: Lucretia Parks performed demonstration for Patient: yes    Return demonstration performed by Patient: yes    Written instructions provided to Patient: yes    Patient signed consent form: yes        Post-Sleep Home Study:    Additional comments by Patient: none    Home Sleep Study Failed:no:    Failure reason: N/A    Reported or Detected: N/A    Scored by:  SALOME Iraheta

## 2023-06-19 PROCEDURE — 95806 SLEEP STUDY UNATT&RESP EFFT: CPT | Performed by: PSYCHIATRY & NEUROLOGY

## 2023-06-26 ENCOUNTER — TELEPHONE (OUTPATIENT)
Dept: SLEEP CENTER | Facility: CLINIC | Age: 48
End: 2023-06-26

## 2023-06-26 NOTE — TELEPHONE ENCOUNTER
Sleep study resulted and shows mild sleep apnea  Left message for patient to call office to review sleep study results  Per study order, Dr Jacky Hernandez, neurology requests follow up with sleep specialist     Needs to schedule consult

## 2023-07-20 ENCOUNTER — OFFICE VISIT (OUTPATIENT)
Dept: NEUROLOGY | Facility: CLINIC | Age: 48
End: 2023-07-20
Payer: COMMERCIAL

## 2023-07-20 VITALS
OXYGEN SATURATION: 99 % | WEIGHT: 169 LBS | DIASTOLIC BLOOD PRESSURE: 84 MMHG | TEMPERATURE: 98.2 F | HEART RATE: 75 BPM | BODY MASS INDEX: 33.18 KG/M2 | HEIGHT: 60 IN | SYSTOLIC BLOOD PRESSURE: 122 MMHG

## 2023-07-20 DIAGNOSIS — F41.9 ANXIETY AND DEPRESSION: ICD-10-CM

## 2023-07-20 DIAGNOSIS — G43.009 MIGRAINE WITHOUT AURA AND WITHOUT STATUS MIGRAINOSUS, NOT INTRACTABLE: Primary | ICD-10-CM

## 2023-07-20 DIAGNOSIS — R29.818 SUSPECTED SLEEP APNEA: ICD-10-CM

## 2023-07-20 DIAGNOSIS — F32.A ANXIETY AND DEPRESSION: ICD-10-CM

## 2023-07-20 PROCEDURE — 99214 OFFICE O/P EST MOD 30 MIN: CPT | Performed by: STUDENT IN AN ORGANIZED HEALTH CARE EDUCATION/TRAINING PROGRAM

## 2023-07-20 RX ORDER — TOPIRAMATE 25 MG/1
TABLET ORAL
Qty: 120 TABLET | Refills: 6 | Status: SHIPPED | OUTPATIENT
Start: 2023-07-20

## 2023-07-20 RX ORDER — RIZATRIPTAN BENZOATE 10 MG/1
10 TABLET ORAL AS NEEDED
Qty: 10 TABLET | Refills: 3 | Status: SHIPPED | OUTPATIENT
Start: 2023-07-20

## 2023-07-20 NOTE — PROGRESS NOTES
[facility] Consult - Follow up   PATIENT:  Mohsen Daley  MRN:  [id number]  :  1975  DATE OF SERVICE:  2023  REFERRED BY: No ref. provider found  PMD: Madalyn Ac DO    Assessment/Plan:   Mohsen Daley is a delightful 50 y.o. female with a past medical history that includes GERD, anxiety, hyperthyroidism here for f/u evaluation of headache. Since her last visit, she feels that her headaches have worsened. She stopped taking amitriptyline because she did not like the side effects, but did not inform me of this. I will have her try topiramate for prevention instead. From an abortive standpoint, I will have her continue with rizatriptan. They do not report any voicemails from sleep medicine regarding a follow-up for her positive sleep study, so I have asked them to try and reach out to schedule an appointment. I believe that treating her mild sleep apnea will also be beneficial for her headaches. Workup:  - Neurologic assessment reveals unremarkable neurological exam.  - MRI brain with and without contrast 2023: No acute findings. No white matter changes. No postcontrast enhancement.  (I have personally reviewed imaging and radiology read)  - Home sleep study 6/15/2023: Mild obstructive sleep apnea     Preventative:  - we discussed headache hygiene and lifestyle factors that may improve headaches  - Topamax with goal 50mg BID  - Currently on through other providers: None  - Past/ failed/contraindicated: Lexapro (mood - did not tolerate), Amitriptyline (side effects)  - future options: SNRI, CGRP med, botox     Acute:  - discussed not taking over-the-counter or prescription pain medications more than 3 days per week to prevent medication overuse/rebound headache  - Rizatriptan 10mg  - Currently on through other providers: None  - Past/ failed/contraindicated: None  - future options:  Triptan, prochlorperazine, Toradol IM or p.o., could consider trial of 5 days of Depakote 500 mg nightly or dexamethasone 2 mg daily for prolonged migraine, francis Otoole nurtec  Patient instructions   Headache Calendar  Please maintain a headache calendar  Consider using phone applications such as Migraine Ming or [employer] Migraine Tracker     Headache/migraine treatment:   Acute medications (for immediate treatment of a headache): It is ok to take ibuprofen, acetaminophen or naproxen (Advil, Tylenol,  Aleve, Excedrin) if they help your headaches you should limit these to No more than 2-3 times a week to avoid medication overuse/rebound headaches.      For your more moderate to severe migraines take this medication early  Maxalt (rizatriptan) 10mg tabs - take one at the onset of headache. May repeat one time after 2 hours if pain has not resolved. (Max 2 a day and 10 a month)      Prescription preventive medications for headaches/migraines   (to take every day to help prevent headaches - not to take at the time of headache):  - Topiramate 25 mg nightly for 1 week, then increase to 25 mg in a.m. And 25 mg in p.m. For 1 week, then take 25 mg in a.m. And 50 mg in p.m. For 1 week, then take 50 mg in a.m. and 50 mg in p.m. And continue  - generally the common side effects improve as your body gets used to the medication. If we need to spread out a more gradual increase of the medication on a longer scale we can, just call if any questions or concerns  - if necessary, if the a.m. dose is causing side effects we can always have you take the full dose at night instead     *Typically these types of medications take time until you see the benefit, although some may see improvement in days, often it may take weeks, especially if the medication is being titrated up to a beneficial level. Please contact us if there are any concerns or questions regarding the medication.      Lifestyle Recommendations:  [x]?  SLEEP - Maintain a regular sleep schedule: Adults need at least 7-8 hours of uninterrupted a night. Maintain good sleep hygiene:  Going to bed and waking up at consistent times, avoiding excessive daytime naps, avoiding caffeinated beverages in the evening, avoid excessive stimulation in the evening and generally using bed primarily for sleeping. One hour before bedtime would recommend turning lights down lower, decreasing your activity (may read quietly, listen to music at a low volume). When you get into bed, should eliminate all technology (no texting, emailing, playing with your phone, iPad or tablet in bed). [x]? HYDRATION - Maintain good hydration. Drink  2L of fluid a day (4 typical small water bottles)  [x]? DIET - Maintain good nutrition. In particular don't skip meals and try and eat healthy balanced meals regularly. [x]? TRIGGERS - Look for other triggers and avoid them: Limit caffeine to 1-2 cups a day or less. Avoid dietary triggers that you have noticed bring on your headaches (this could include aged cheese, peanuts, MSG, aspartame and nitrates). [x]? EXERCISE - physical exercise as we all know is good for you in many ways, and not only is good for your heart, but also is beneficial for your mental health, cognitive health and  chronic pain/headaches. I would encourage at the least 5 days of physical exercise weekly for at least 30 minutes.      Education and Follow-up  [x]? Please call with any questions or concerns. Of course if any new concerning symptoms go to the emergency department. [x]? Follow up in 6 months  Subjective:   7/20/23: Since her last visit, she feels that her headaches have worsened. She is experiencing them daily. She stopped taking amitriptyline because she did not like the side effects (weak). She is a little unclear with Rizatriptan, but appears that it may have been helpful. They do not report any voicemails left by sleep medicine for scheduling.     Previous History:  1/25/23: Ms. Colette Todd presents with a history of headaches for the last few years. Denies any history of headaches as a child or growing up. The description of her headaches sound like migraines, which is the likely diagnosis especially since her daughter reports that she also has a history of migraines. She has never been formally evaluated for them or treated for them. We discussed a variety of potential treatment options, but thought that amitriptyline would be a good choice given her comorbid anxiety/depression and difficulties with sleep. From an abortive standpoint I will have her try rizatriptan. I suspect she has multiple contributing components to her ongoing headaches, but I think sleep may be playing a significant role. She only sleeps for about 2 to 3 hours per night and I am also concerned about sleep apnea. I have recommended that she undergo a sleep study for further evaluation. Due to a recent increase in frequency/intensity of her headaches I would also like to obtain an MRI of the brain. I have asked her to make an appointment with her eye doctor for her yearly eye exam to ensure that there are no other issues causing her blurry vision. I have also recommended that she avoid driving when she has headaches if she feels that she cannot focus or feels unsafe.   Past Medical History:     Past Medical History:   Diagnosis Date   • Disease of thyroid gland    • Migraine        Patient Active Problem List   Diagnosis   • Hyperthyroidism   • Vitamin D deficiency   • Gastroesophageal reflux disease without esophagitis   • New daily persistent headache   • Acquired pericardial cyst   • Psychogenic vomiting   • Anxiety   • Migraine without aura and without status migrainosus, not intractable   • Abnormal thyroid function test   • Thyroid antibody positive   • RAJESH (obstructive sleep apnea)       Medications:      Current Outpatient Medications   Medication Sig Dispense Refill   • omeprazole (PriLOSEC) 40 MG capsule Take 1 capsule (40 mg total) by mouth 2 (two) times a day (Patient taking differently: Take 40 mg by mouth daily) 180 capsule 3   • amitriptyline (ELAVIL) 10 mg tablet Take 1 tablet (10 mg total) by mouth daily at bedtime for 7 days, THEN 2 tablets (20 mg total) daily at bedtime for 7 days. (Patient not taking: Reported on 7/20/2023) 21 tablet 0   • Cholecalciferol (Vitamin D3) 50 MCG (2000 UT) capsule Take 1.5 capsules (3,000 Units total) by mouth daily (Patient not taking: Reported on 7/20/2023)     • rizatriptan (Maxalt) 10 mg tablet Take 1 tablet (10 mg total) by mouth as needed for migraine Take at the onset of migraine; if symptoms continue or return, may take another dose at least 2 hours after first dose. Take no more than 2 doses in a day. (Patient not taking: Reported on 7/20/2023) 10 tablet 3     No current facility-administered medications for this visit. Allergies:    No Known Allergies    Family History:     History reviewed. No pertinent family history.     Social History:     Social History     Socioeconomic History   • Marital status: /Civil Union     Spouse name: Not on file   • Number of children: Not on file   • Years of education: Not on file   • Highest education level: Not on file   Occupational History   • Not on file   Tobacco Use   • Smoking status: Never   • Smokeless tobacco: Never   Vaping Use   • Vaping Use: Never used   Substance and Sexual Activity   • Alcohol use: Never   • Drug use: Never   • Sexual activity: Not on file   Other Topics Concern   • Not on file   Social History Narrative   • Not on file     Social Determinants of Health     Financial Resource Strain: Not on file   Food Insecurity: Not on file   Transportation Needs: Not on file   Physical Activity: Not on file   Stress: Not on file   Social Connections: Not on file   Intimate Partner Violence: Not on file   Housing Stability: Not on file         Objective:   Physical Exam: Vitals:            Constitutional:  /84 (BP Location: Left arm, Patient Position: Sitting, Cuff Size: Adult)   Pulse 75   Temp 98.2 °F (36.8 °C) (Temporal)   Ht 5' (1.524 m)   Wt 76.7 kg (169 lb)   SpO2 99%   BMI 33.01 kg/m²   BP Readings from Last 3 Encounters:   07/20/23 122/84   04/27/23 123/63   03/01/23 130/82     Pulse Readings from Last 3 Encounters:   07/20/23 75   04/27/23 85   03/01/23 80         Well developed, well nourished, well groomed. No dysmorphic features. HEENT:  Normocephalic atraumatic. See neuro exam   Chest:  Respirations appear regular and unlabored. Cardiovascular:  no observed significant swelling. Musculoskeletal:  (see below under neurologic exam for evaluation of motor function and gait)   Skin:  warm and dry, not diaphoretic. Psychiatric:  Normal behavior and appropriate affect       Neurological Examination:     Mental status/cognitive function:   and remote memory intact. Attention span and concentration as well as fund of knowledge are appropriate for age. Normal language and spontaneous speech. Cranial Nerves:  III, IV, VI-Pupils were equal, round. Extraocular movements were full and conjugate   VII-facial expression symmetric  VIII-hearing grossly intact bilaterally   Motor Exam: symmetric bulk throughout. no atrophy, fasciculations or abnormal movements noted. Coordination:  no apparent dysmetria, ataxia or tremor noted  Gait: steady casual gait  Review of Systems:   Constitutional: Negative for appetite change, fatigue and fever. HENT: Negative. Negative for hearing loss, tinnitus, trouble swallowing and voice change. Eyes: Negative. Negative for photophobia, pain and visual disturbance. Respiratory: Negative. Negative for shortness of breath. Cardiovascular: Negative. Negative for palpitations. Gastrointestinal: Negative. Negative for nausea and vomiting. Endocrine: Negative. Negative for cold intolerance.    Genitourinary: Negative. Negative for dysuria, frequency and urgency. Musculoskeletal: Negative for back pain, gait problem, myalgias and neck pain. Skin: Negative. Negative for rash. Allergic/Immunologic: Negative. Neurological: Positive for dizziness and headaches. Negative for tremors, seizures, syncope, facial asymmetry, speech difficulty, weakness, light-headedness and numbness. Hematological: Negative. Does not bruise/bleed easily. Psychiatric/Behavioral: Negative. Negative for confusion, hallucinations and sleep disturbance. All other systems reviewed and are negative. I have spent 15 minutes with Patient and family today in which greater than 50% of this time was spent in counseling/coordination of care regarding Diagnostic results, Prognosis, Risks and benefits of tx options, Patient and family education, Importance of tx compliance, Impressions, Documenting in the medical record, Reviewing / ordering tests, medicine, procedures   and Obtaining or reviewing history  . I also spent 15 minutes non face to face for this patient the same day.      Activity Minutes   Precharting/reviewing 10   Patient care/counseling 15   Postcharting/care coordination 5       Author:  Kobi Menchaca DO 7/20/2023 3:00 PM

## 2023-07-20 NOTE — PROGRESS NOTES
Review of Systems   Constitutional: Negative for appetite change, fatigue and fever. HENT: Negative. Negative for hearing loss, tinnitus, trouble swallowing and voice change. Eyes: Negative. Negative for photophobia, pain and visual disturbance. Respiratory: Negative. Negative for shortness of breath. Cardiovascular: Negative. Negative for palpitations. Gastrointestinal: Negative. Negative for nausea and vomiting. Endocrine: Negative. Negative for cold intolerance. Genitourinary: Negative. Negative for dysuria, frequency and urgency. Musculoskeletal: Negative for back pain, gait problem, myalgias and neck pain. Skin: Negative. Negative for rash. Allergic/Immunologic: Negative. Neurological: Positive for dizziness and headaches. Negative for tremors, seizures, syncope, facial asymmetry, speech difficulty, weakness, light-headedness and numbness. Hematological: Negative. Does not bruise/bleed easily. Psychiatric/Behavioral: Negative. Negative for confusion, hallucinations and sleep disturbance. All other systems reviewed and are negative. Since your last visit are your headaches   worse  Any change to the headache type?  no  What is your current headache frequency:   Everyday jessica. In am   Are you taking your current medications as prescribed?  no  If no, why not? Pt didn't like side effects   Do you have any side effects?      How may days per week do you take an abortive medicine?   0

## 2023-07-20 NOTE — PATIENT INSTRUCTIONS
Headache Calendar  Please maintain a headache calendar  Consider using phone applications such as Migraine Ming or [place] [employer] Migraine Tracker     Headache/migraine treatment:   Acute medications (for immediate treatment of a headache): It is ok to take ibuprofen, acetaminophen or naproxen (Advil, Tylenol,  Aleve, Excedrin) if they help your headaches you should limit these to No more than 2-3 times a week to avoid medication overuse/rebound headaches. For your more moderate to severe migraines take this medication early  Maxalt (rizatriptan) 10mg tabs - take one at the onset of headache. May repeat one time after 2 hours if pain has not resolved. (Max 2 a day and 10 a month)      Prescription preventive medications for headaches/migraines   (to take every day to help prevent headaches - not to take at the time of headache):  - Topiramate 25 mg nightly for 1 week, then increase to 25 mg in a.m. And 25 mg in p.m. For 1 week, then take 25 mg in a.m. And 50 mg in p.m. For 1 week, then take 50 mg in a.m. and 50 mg in p.m. And continue  - generally the common side effects improve as your body gets used to the medication. If we need to spread out a more gradual increase of the medication on a longer scale we can, just call if any questions or concerns  - if necessary, if the a.m. dose is causing side effects we can always have you take the full dose at night instead     *Typically these types of medications take time until you see the benefit, although some may see improvement in days, often it may take weeks, especially if the medication is being titrated up to a beneficial level. Please contact us if there are any concerns or questions regarding the medication. Lifestyle Recommendations:  [x] SLEEP - Maintain a regular sleep schedule: Adults need at least 7-8 hours of uninterrupted a night.  Maintain good sleep hygiene:  Going to bed and waking up at consistent times, avoiding excessive daytime naps, avoiding caffeinated beverages in the evening, avoid excessive stimulation in the evening and generally using bed primarily for sleeping. One hour before bedtime would recommend turning lights down lower, decreasing your activity (may read quietly, listen to music at a low volume). When you get into bed, should eliminate all technology (no texting, emailing, playing with your phone, iPad or tablet in bed). [x] HYDRATION - Maintain good hydration. Drink  2L of fluid a day (4 typical small water bottles)  [x] DIET - Maintain good nutrition. In particular don't skip meals and try and eat healthy balanced meals regularly. [x] TRIGGERS - Look for other triggers and avoid them: Limit caffeine to 1-2 cups a day or less. Avoid dietary triggers that you have noticed bring on your headaches (this could include aged cheese, peanuts, MSG, aspartame and nitrates). [x] EXERCISE - physical exercise as we all know is good for you in many ways, and not only is good for your heart, but also is beneficial for your mental health, cognitive health and  chronic pain/headaches. I would encourage at the least 5 days of physical exercise weekly for at least 30 minutes. Education and Follow-up  [x] Please call with any questions or concerns. Of course if any new concerning symptoms go to the emergency department.   [x] Follow up in 6 months

## 2023-09-18 ENCOUNTER — TELEPHONE (OUTPATIENT)
Dept: SLEEP CENTER | Facility: CLINIC | Age: 48
End: 2023-09-18

## 2023-09-18 ENCOUNTER — OFFICE VISIT (OUTPATIENT)
Dept: SLEEP CENTER | Facility: CLINIC | Age: 48
End: 2023-09-18
Payer: COMMERCIAL

## 2023-09-18 VITALS
HEIGHT: 60 IN | SYSTOLIC BLOOD PRESSURE: 122 MMHG | WEIGHT: 167 LBS | DIASTOLIC BLOOD PRESSURE: 82 MMHG | BODY MASS INDEX: 32.79 KG/M2

## 2023-09-18 DIAGNOSIS — E66.9 CLASS 1 OBESITY WITHOUT SERIOUS COMORBIDITY WITH BODY MASS INDEX (BMI) OF 32.0 TO 32.9 IN ADULT, UNSPECIFIED OBESITY TYPE: ICD-10-CM

## 2023-09-18 DIAGNOSIS — G47.33 OBSTRUCTIVE SLEEP APNEA (ADULT) (PEDIATRIC): ICD-10-CM

## 2023-09-18 DIAGNOSIS — R06.83 SNORING: Primary | ICD-10-CM

## 2023-09-18 LAB
DME PARACHUTE DELIVERY DATE REQUESTED: NORMAL
DME PARACHUTE DELIVERY NOTE: NORMAL
DME PARACHUTE ITEM DESCRIPTION: NORMAL
DME PARACHUTE ORDER STATUS: NORMAL
DME PARACHUTE SUPPLIER NAME: NORMAL
DME PARACHUTE SUPPLIER PHONE: NORMAL

## 2023-09-18 PROCEDURE — 99203 OFFICE O/P NEW LOW 30 MIN: CPT | Performed by: INTERNAL MEDICINE

## 2023-09-18 NOTE — PROGRESS NOTES
Sleep Consultation   Annalisa Frey 46 y.o. female MRN: [id number]      Reason for consultation: Obstructive sleep apnea     Requesting physician: Eleonora Brownlee, DO    Assessment/Plan     Annalisa Frey is a 46 y.o. female with a past medical history of GERD, anxiety, hypothyroidism presents for evaluation of obstructive sleep apnea. 1. Obstructive sleep apnea  Home sleep study 6/13/2023 showed DAMION 11.1  Mallampati class 2, Body mass index is 32.61 kg/m²., Neck Circumference: 13.75. S/s: Snoring, excessive daytime sleepiness, excessive tiredness  Belle[place] score:3  I discussed in depth the diagnostic studies and treatment options involved with obstructive sleep apnea  I also discussed in depth the risk of leaving sleep apnea untreated including hypertension, heart failure, arrhythmia, MI and stroke. The patient is agreeable to undergo treatment of obstructive sleep apnea. Plan  Ordered auto CPAP with pressure range 5-15 cm H2O  Follow-up in 3 months for compliance visit    2. Snoring  As above    3. Obesity  Counseled patient on lifestyle modifications including diet and exercise      History of Present Illness   HPI:  Annalisa Frey is a 46 y.o. female with a past medical history of GERD, anxiety, hypothyroidism presents for evaluation of obstructive sleep apnea. She underwent home sleep study which showed an DAMION of 11.1 with an oxygen saturation edson of 85%. She reports excessive daytime sleepiness and frequent headaches. She has difficulty falling and staying asleep. She goes to bed at 10 PM.  She has difficulty falling asleep. She wakes up 1-2 times at night. She has difficulty falling back asleep. She wakes up at 7-8 AM.  She sleeps on average 7 hours. She does not feel refreshed when she wakes up. She snores loudly. She has an Belleview score of 3. She does not drink alcohol or smoke cigarettes.       Review of Systems      Genitourinary none   Cardiology none   Gastrointestinal none Neurology none   Constitutional none   Integumentary none   Psychiatry none   Musculoskeletal none   Pulmonary none   ENT none   Endocrine none   Hematological none         Historical Information   Past Medical History:   Diagnosis Date   • Disease of thyroid gland    • Migraine      No past surgical history on file. No family history on file. Social History     Socioeconomic History   • Marital status: /Civil Union     Spouse name: Not on file   • Number of children: Not on file   • Years of education: Not on file   • Highest education level: Not on file   Occupational History   • Not on file   Tobacco Use   • Smoking status: Never   • Smokeless tobacco: Never   Vaping Use   • Vaping Use: Never used   Substance and Sexual Activity   • Alcohol use: Never   • Drug use: Never   • Sexual activity: Not on file   Other Topics Concern   • Not on file   Social History Narrative   • Not on file     Social Determinants of Health     Financial Resource Strain: Not on file   Food Insecurity: Not on file   Transportation Needs: Not on file   Physical Activity: Not on file   Stress: Not on file   Social Connections: Not on file   Intimate Partner Violence: Not on file   Housing Stability: Not on file       Occupational History: Unknown    Meds/Allergies   No Known Allergies    Home medications:  Prior to Admission medications    Medication Sig Start Date End Date Taking?  Authorizing Provider   Cholecalciferol (Vitamin D3) 50 MCG (2000 UT) capsule Take 1.5 capsules (3,000 Units total) by mouth daily 3/1/23  Yes Maryan Perea PA-C   omeprazole (PriLOSEC) 40 MG capsule Take 1 capsule (40 mg total) by mouth 2 (two) times a day  Patient taking differently: Take 40 mg by mouth daily 10/31/22  Yes DO laurie Warnerzatriptan (Maxalt) 10 mg tablet Take 1 tablet (10 mg total) by mouth as needed for migraine Take at the onset of migraine; if symptoms continue or return, may take another dose at least 2 hours after first dose. Take no more than 2 doses in a day. Patient not taking: Reported on 9/18/2023 7/20/23   Hamida Bedolla DO   topiramate (TOPAMAX) 25 mg tablet 1 tab PO QHS for 1 week, increase as tolerated to 1 tab BID for 1 week, then 1 tab QAM and 2 tabs QHS for 1 week and finish at 2 tabs BID. Patient not taking: Reported on 9/18/2023 7/20/23   Hamida Bedolla DO       Vitals:   Blood pressure 122/82, height 5' (1.524 m), weight 75.8 kg (167 lb). ,  Body mass index is 32.61 kg/m². Neck Circumference: 13.75    Physical Exam  General:  Awake alert and oriented x 3, conversant without conversational dyspnea, NAD, normal affect  HEENT:  PERRL, Sclera noninjected, nonicteric OU, Nares patent,  no craniofacial abnormalities, Mucous membranes, moist, no oral lesions, normal dentition, Mallampati class 2  NECK:  Trachea midline, no accessory muscle use, no stridor, no cervical or supraclavicular adenopathy, JVP not elevated  CARDIAC: Reg, single s1/S2, no m/r/g  PULM: CTA bilaterally no wheezing, rhonchi or rales  EXT: No cyanosis, no clubbing, no edema, normal capillary refill  NEURO: no focal neurologic deficits, AAOx3, moving all extremities appropriately    Labs: I have personally reviewed pertinent lab results.   Lab Results   Component Value Date    WBC 7.32 09/20/2022    HGB 9.9 (L) 09/20/2022    HCT 32.3 (L) 09/20/2022    MCV 76 (L) 09/20/2022     09/20/2022      Lab Results   Component Value Date    CALCIUM 9.1 09/20/2022    K 3.9 09/20/2022    CO2 29 09/20/2022     09/20/2022    BUN 19 09/20/2022    CREATININE 0.67 09/20/2022     No results found for: "IRON", "TIBC", "FERRITIN"  No results found for: "Jaren Salmon"  No results found for: "FOLATE"      Arterial Blood Gas result:  NA    Sleep studies:  Home sleep study 6/13/2023 showed DAMION 11.1    Compliance Data:  Manjula Pulido MD  [facility]

## 2023-09-18 NOTE — TELEPHONE ENCOUNTER
Rx for CPAP and clinical information sent to [address] [place] [address] via "[employer].

## 2023-09-29 LAB
DME PARACHUTE DELIVERY DATE EXPECTED: NORMAL
DME PARACHUTE DELIVERY DATE REQUESTED: NORMAL
DME PARACHUTE DELIVERY NOTE: NORMAL
DME PARACHUTE ITEM DESCRIPTION: NORMAL
DME PARACHUTE ORDER STATUS: NORMAL
DME PARACHUTE SUPPLIER NAME: NORMAL
DME PARACHUTE SUPPLIER PHONE: NORMAL

## 2023-10-04 LAB
DME PARACHUTE DELIVERY DATE ACTUAL: NORMAL
DME PARACHUTE DELIVERY DATE EXPECTED: NORMAL
DME PARACHUTE DELIVERY DATE REQUESTED: NORMAL
DME PARACHUTE DELIVERY NOTE: NORMAL
DME PARACHUTE ITEM DESCRIPTION: NORMAL
DME PARACHUTE ORDER STATUS: NORMAL
DME PARACHUTE SUPPLIER NAME: NORMAL
DME PARACHUTE SUPPLIER PHONE: NORMAL

## 2023-10-05 ENCOUNTER — OFFICE VISIT (OUTPATIENT)
Dept: ENDOCRINOLOGY | Facility: CLINIC | Age: 48
End: 2023-10-05
Payer: COMMERCIAL

## 2023-10-05 VITALS
HEART RATE: 74 BPM | DIASTOLIC BLOOD PRESSURE: 70 MMHG | BODY MASS INDEX: 32.55 KG/M2 | OXYGEN SATURATION: 97 % | HEIGHT: 60 IN | WEIGHT: 165.8 LBS | SYSTOLIC BLOOD PRESSURE: 112 MMHG

## 2023-10-05 DIAGNOSIS — E55.9 VITAMIN D DEFICIENCY: ICD-10-CM

## 2023-10-05 DIAGNOSIS — E05.90 HYPERTHYROIDISM: Primary | ICD-10-CM

## 2023-10-05 DIAGNOSIS — G47.33 OSA (OBSTRUCTIVE SLEEP APNEA): ICD-10-CM

## 2023-10-05 PROCEDURE — 99213 OFFICE O/P EST LOW 20 MIN: CPT | Performed by: NURSE PRACTITIONER

## 2023-10-05 NOTE — ASSESSMENT & PLAN NOTE
Symptoms not consistent with hypo/hyperthyroidism. Due for thyroid function tests. History of normal TSI and TRAB with positive thyroid antibodies and history of levothyroxine use over 22 years ago. Last thyroid function tests October 2022.

## 2023-10-05 NOTE — PROGRESS NOTES
Established Patient Progress Note       CC: Abnormal Thyroid Function  Vitamin D Deficiency    Impression & Plan:    Problem List Items Addressed This Visit        Endocrine    Hyperthyroidism - Primary     Symptoms not consistent with hypo/hyperthyroidism. Due for thyroid function tests. History of normal TSI and TRAB with positive thyroid antibodies and history of levothyroxine use over 22 years ago. Last thyroid function tests October 2022. Relevant Orders    TSH, 3rd generation    T4, free- Lab Collect       Respiratory    RAJESH (obstructive sleep apnea)     Has Cpap            Other    Vitamin D deficiency     Continues taking vitamin D3 3000 IU daily          Relevant Orders    Vitamin D 25 hydroxy       Orders Placed This Encounter   Procedures   • TSH, 3rd generation     This is a patient instruction: This test is non-fasting. Please drink two glasses of water morning of bloodwork. Standing Status:   Future     Standing Expiration Date:   4/5/2024   • T4, free- Lab Collect     Standing Status:   Future     Standing Expiration Date:   10/5/2024   • Vitamin D 25 hydroxy     Standing Status:   Future     Standing Expiration Date:   4/5/2024       History of Present Illness:     Annalisa Frey is a 46 y.o. female with a history of subclinical hyperthyroidism and vitamin D deficiency. Thyroid antibodies are positive but TSI and TRAB were previously negative. She has a history of thyroid disorder. No family hx of thyroid cancer. Denies history of external radiation to head/neck/chest. Previously on Thrax in [place] at least 22 years ago, it is a brand levothyroxine. Feels tired, dizzy with lots of headaches. Followed by neurology for headaches, stopped Maxalt. Has anxiety. Occasionally has jitteriness, or tremors. Has heart palpitations. Denies tachycardia. Denies constipation or hyperdefecation. Sometimes very cold. Denies neck compressive symptoms. Denies pain with EOM, dryness, or irritation. Vitamin D deficiency takes 3000 IU daily. Patient Active Problem List   Diagnosis   • Hyperthyroidism   • Vitamin D deficiency   • Gastroesophageal reflux disease without esophagitis   • New daily persistent headache   • Acquired pericardial cyst   • Psychogenic vomiting   • Anxiety   • Migraine without aura and without status migrainosus, not intractable   • Abnormal thyroid function test   • Thyroid antibody positive   • RAJESH (obstructive sleep apnea)      Past Medical History:   Diagnosis Date   • Disease of thyroid gland    • Migraine       History reviewed. No pertinent surgical history. Family History   Problem Relation Age of Onset   • Diabetes type II Mother    • Diabetes type II Father    • Heart disease Father    • Thyroid disease Sister    • Colon cancer Maternal Grandmother    • Other Daughter      Social History     Tobacco Use   • Smoking status: Never   • Smokeless tobacco: Never   Substance Use Topics   • Alcohol use: Never     No Known Allergies    Current Outpatient Medications:   •  Cholecalciferol (Vitamin D3) 50 MCG (2000 UT) capsule, Take 1.5 capsules (3,000 Units total) by mouth daily, Disp: , Rfl:   •  omeprazole (PriLOSEC) 40 MG capsule, Take 1 capsule (40 mg total) by mouth 2 (two) times a day (Patient taking differently: Take 40 mg by mouth daily), Disp: 180 capsule, Rfl: 3  •  rizatriptan (Maxalt) 10 mg tablet, Take 1 tablet (10 mg total) by mouth as needed for migraine Take at the onset of migraine; if symptoms continue or return, may take another dose at least 2 hours after first dose. Take no more than 2 doses in a day.  (Patient not taking: Reported on 9/18/2023), Disp: 10 tablet, Rfl: 3  •  venlafaxine (EFFEXOR-XR) 37.5 mg 24 hr capsule, Take 1 capsule (37.5 mg total) by mouth daily for 7 days Then increase to 75 mg daily, Disp: 7 capsule, Rfl: 0  •  venlafaxine (EFFEXOR-XR) 75 mg 24 hr capsule, Take 1 capsule (75 mg total) by mouth daily (Patient not taking: Reported on 10/5/2023), Disp: 30 capsule, Rfl: 5    Review of Systems  See HPI. All other systems reviewed and are negative. Physical Exam:  Body mass index is 32.38 kg/m². /70 (BP Location: Left arm, Patient Position: Sitting, Cuff Size: Large)   Pulse 74   Ht 5' (1.524 m)   Wt 75.2 kg (165 lb 12.8 oz)   LMP 10/05/2023   SpO2 97%   BMI 32.38 kg/m²    Wt Readings from Last 3 Encounters:   10/05/23 75.2 kg (165 lb 12.8 oz)   09/18/23 75.8 kg (167 lb)   07/20/23 76.7 kg (169 lb)     Physical Exam  Vitals reviewed. Constitutional:       Appearance: Normal appearance. Cardiovascular:      Rate and Rhythm: Normal rate and regular rhythm. Pulses: Normal pulses. Heart sounds: Normal heart sounds. Pulmonary:      Effort: Pulmonary effort is normal.      Breath sounds: Normal breath sounds. Skin:     General: Skin is warm and dry. Capillary Refill: Capillary refill takes less than 2 seconds. Neurological:      General: No focal deficit present. Mental Status: She is alert and oriented to person, place, and time. Psychiatric:         Mood and Affect: Mood normal.         Behavior: Behavior normal.     Labs:     Lab Results   Component Value Date    CREATININE 0.67 09/20/2022    CREATININE 0.77 12/08/2021    BUN 19 09/20/2022    K 3.9 09/20/2022     09/20/2022    CO2 29 09/20/2022     eGFR   Date Value Ref Range Status   09/20/2022 103 ml/min/1.73sq m Final       No results found for: "CHOL", "HDL", "TRIG", "CHOLHDL"    Lab Results   Component Value Date    ALT 23 09/20/2022    AST 32 09/20/2022    ALKPHOS 82 09/20/2022       Lab Results   Component Value Date    FREET4 1.12 12/08/2021     Discussed with the patient and all questioned fully answered. She will call me if any problems arise. Follow-up appointment in 6 months.      Counseled patient on diagnostic results, prognosis, risk and benefit of treatment options, instruction for management, importance of treatment compliance, Risk  factor reduction and impressions    Deneice Rachel, NITHYANP

## 2023-10-10 LAB
25(OH)D3+25(OH)D2 SERPL-MCNC: 28.9 NG/ML (ref 30–100)
T4 FREE SERPL-MCNC: 1.13 NG/DL (ref 0.82–1.77)
TSH SERPL DL<=0.005 MIU/L-ACNC: 1.16 UIU/ML (ref 0.45–4.5)

## 2023-10-15 DIAGNOSIS — F41.9 ANXIETY AND DEPRESSION: ICD-10-CM

## 2023-10-15 DIAGNOSIS — F32.A ANXIETY AND DEPRESSION: ICD-10-CM

## 2023-10-15 DIAGNOSIS — G43.009 MIGRAINE WITHOUT AURA AND WITHOUT STATUS MIGRAINOSUS, NOT INTRACTABLE: ICD-10-CM

## 2023-10-16 RX ORDER — VENLAFAXINE HYDROCHLORIDE 75 MG/1
75 CAPSULE, EXTENDED RELEASE ORAL DAILY
Qty: 90 CAPSULE | Refills: 2 | Status: SHIPPED | OUTPATIENT
Start: 2023-10-16

## 2023-11-06 DIAGNOSIS — K21.9 GASTROESOPHAGEAL REFLUX DISEASE WITHOUT ESOPHAGITIS: ICD-10-CM

## 2023-11-06 RX ORDER — OMEPRAZOLE 40 MG/1
40 CAPSULE, DELAYED RELEASE ORAL 2 TIMES DAILY
Qty: 180 CAPSULE | Refills: 3 | Status: SHIPPED | OUTPATIENT
Start: 2023-11-06

## 2023-12-16 ENCOUNTER — HOSPITAL ENCOUNTER (EMERGENCY)
Facility: HOSPITAL | Age: 48
Discharge: HOME | End: 2023-12-16
Attending: EMERGENCY MEDICINE | Admitting: EMERGENCY MEDICINE
Payer: COMMERCIAL

## 2023-12-16 ENCOUNTER — ANESTHESIA (EMERGENCY)
Dept: PERIOP | Facility: HOSPITAL | Age: 48
End: 2023-12-16
Payer: COMMERCIAL

## 2023-12-16 ENCOUNTER — APPOINTMENT (EMERGENCY)
Dept: CT IMAGING | Facility: HOSPITAL | Age: 48
End: 2023-12-16
Payer: COMMERCIAL

## 2023-12-16 ENCOUNTER — APPOINTMENT (EMERGENCY)
Dept: ULTRASOUND IMAGING | Facility: HOSPITAL | Age: 48
End: 2023-12-16
Payer: COMMERCIAL

## 2023-12-16 ENCOUNTER — APPOINTMENT (EMERGENCY)
Dept: RADIOLOGY | Facility: HOSPITAL | Age: 48
End: 2023-12-16
Payer: COMMERCIAL

## 2023-12-16 ENCOUNTER — ANESTHESIA EVENT (EMERGENCY)
Dept: PERIOP | Facility: HOSPITAL | Age: 48
End: 2023-12-16
Payer: COMMERCIAL

## 2023-12-16 VITALS
DIASTOLIC BLOOD PRESSURE: 82 MMHG | HEART RATE: 62 BPM | SYSTOLIC BLOOD PRESSURE: 168 MMHG | RESPIRATION RATE: 18 BRPM | OXYGEN SATURATION: 100 % | TEMPERATURE: 98 F

## 2023-12-16 DIAGNOSIS — K80.20 SYMPTOMATIC CHOLELITHIASIS: Primary | ICD-10-CM

## 2023-12-16 DIAGNOSIS — R10.13 EPIGASTRIC ABDOMINAL PAIN: ICD-10-CM

## 2023-12-16 DIAGNOSIS — R74.01 TRANSAMINITIS: ICD-10-CM

## 2023-12-16 PROBLEM — I10 HYPERTENSION: Status: ACTIVE | Noted: 2023-12-16

## 2023-12-16 PROBLEM — Z01.818 PRE-OPERATIVE CLEARANCE: Status: ACTIVE | Noted: 2023-12-16

## 2023-12-16 PROBLEM — R06.09 DOE (DYSPNEA ON EXERTION): Status: ACTIVE | Noted: 2023-12-16

## 2023-12-16 LAB
ALBUMIN SERPL BCP-MCNC: 4.3 G/DL (ref 3.5–5)
ALP SERPL-CCNC: 113 U/L (ref 34–104)
ALT SERPL W P-5'-P-CCNC: 110 U/L (ref 7–52)
ANION GAP SERPL CALCULATED.3IONS-SCNC: 8 MMOL/L
AST SERPL W P-5'-P-CCNC: 179 U/L (ref 13–39)
BACTERIA UR QL AUTO: ABNORMAL /HPF
BASOPHILS # BLD AUTO: 0.02 THOUSANDS/ÂΜL (ref 0–0.1)
BASOPHILS NFR BLD AUTO: 0 % (ref 0–1)
BILIRUB SERPL-MCNC: 0.68 MG/DL (ref 0.2–1)
BILIRUB UR QL STRIP: NEGATIVE
BUN SERPL-MCNC: 25 MG/DL (ref 5–25)
CALCIUM SERPL-MCNC: 9.1 MG/DL (ref 8.4–10.2)
CARDIAC TROPONIN I PNL SERPL HS: 2 NG/L
CHLORIDE SERPL-SCNC: 103 MMOL/L (ref 96–108)
CLARITY UR: CLEAR
CO2 SERPL-SCNC: 27 MMOL/L (ref 21–32)
COLOR UR: YELLOW
CREAT SERPL-MCNC: 0.76 MG/DL (ref 0.6–1.3)
EOSINOPHIL # BLD AUTO: 0.04 THOUSAND/ÂΜL (ref 0–0.61)
EOSINOPHIL NFR BLD AUTO: 1 % (ref 0–6)
ERYTHROCYTE [DISTWIDTH] IN BLOOD BY AUTOMATED COUNT: 23.2 % (ref 11.6–15.1)
EXT PREGNANCY TEST URINE: NEGATIVE
EXT. CONTROL: NORMAL
GFR SERPL CREATININE-BSD FRML MDRD: 91 ML/MIN/1.73SQ M
GLUCOSE SERPL-MCNC: 118 MG/DL (ref 65–140)
GLUCOSE UR STRIP-MCNC: NEGATIVE MG/DL
HCT VFR BLD AUTO: 33.2 % (ref 34.8–46.1)
HGB BLD-MCNC: 9.5 G/DL (ref 11.5–15.4)
HGB UR QL STRIP.AUTO: NEGATIVE
IMM GRANULOCYTES # BLD AUTO: 0.02 THOUSAND/UL (ref 0–0.2)
IMM GRANULOCYTES NFR BLD AUTO: 0 % (ref 0–2)
KETONES UR STRIP-MCNC: NEGATIVE MG/DL
LACTATE SERPL-SCNC: 1.5 MMOL/L (ref 0.5–2)
LEUKOCYTE ESTERASE UR QL STRIP: ABNORMAL
LIPASE SERPL-CCNC: 64 U/L (ref 11–82)
LYMPHOCYTES # BLD AUTO: 1.17 THOUSANDS/ÂΜL (ref 0.6–4.47)
LYMPHOCYTES NFR BLD AUTO: 17 % (ref 14–44)
MCH RBC QN AUTO: 20.7 PG (ref 26.8–34.3)
MCHC RBC AUTO-ENTMCNC: 28.6 G/DL (ref 31.4–37.4)
MCV RBC AUTO: 72 FL (ref 82–98)
MONOCYTES # BLD AUTO: 0.29 THOUSAND/ÂΜL (ref 0.17–1.22)
MONOCYTES NFR BLD AUTO: 4 % (ref 4–12)
MUCOUS THREADS UR QL AUTO: ABNORMAL
NEUTROPHILS # BLD AUTO: 5.24 THOUSANDS/ÂΜL (ref 1.85–7.62)
NEUTS SEG NFR BLD AUTO: 78 % (ref 43–75)
NITRITE UR QL STRIP: NEGATIVE
NON-SQ EPI CELLS URNS QL MICRO: ABNORMAL /HPF
NRBC BLD AUTO-RTO: 0 /100 WBCS
PH UR STRIP.AUTO: 6 [PH]
PLATELET # BLD AUTO: 274 THOUSANDS/UL (ref 149–390)
PMV BLD AUTO: 9 FL (ref 8.9–12.7)
POTASSIUM SERPL-SCNC: 4 MMOL/L (ref 3.5–5.3)
PROT SERPL-MCNC: 7.6 G/DL (ref 6.4–8.4)
PROT UR STRIP-MCNC: ABNORMAL MG/DL
RBC # BLD AUTO: 4.59 MILLION/UL (ref 3.81–5.12)
RBC #/AREA URNS AUTO: ABNORMAL /HPF
SODIUM SERPL-SCNC: 138 MMOL/L (ref 135–147)
SP GR UR STRIP.AUTO: 1.03 (ref 1–1.03)
UROBILINOGEN UR STRIP-ACNC: 2 MG/DL
WBC # BLD AUTO: 6.78 THOUSAND/UL (ref 4.31–10.16)
WBC #/AREA URNS AUTO: ABNORMAL /HPF

## 2023-12-16 PROCEDURE — 83605 ASSAY OF LACTIC ACID: CPT

## 2023-12-16 PROCEDURE — 85025 COMPLETE CBC W/AUTO DIFF WBC: CPT

## 2023-12-16 PROCEDURE — 76705 ECHO EXAM OF ABDOMEN: CPT

## 2023-12-16 PROCEDURE — 96375 TX/PRO/DX INJ NEW DRUG ADDON: CPT

## 2023-12-16 PROCEDURE — 83690 ASSAY OF LIPASE: CPT

## 2023-12-16 PROCEDURE — 96366 THER/PROPH/DIAG IV INF ADDON: CPT

## 2023-12-16 PROCEDURE — 71046 X-RAY EXAM CHEST 2 VIEWS: CPT

## 2023-12-16 PROCEDURE — G1004 CDSM NDSC: HCPCS

## 2023-12-16 PROCEDURE — 99285 EMERGENCY DEPT VISIT HI MDM: CPT

## 2023-12-16 PROCEDURE — 80053 COMPREHEN METABOLIC PANEL: CPT

## 2023-12-16 PROCEDURE — 36415 COLL VENOUS BLD VENIPUNCTURE: CPT

## 2023-12-16 PROCEDURE — 99204 OFFICE O/P NEW MOD 45 MIN: CPT | Performed by: GENERAL PRACTICE

## 2023-12-16 PROCEDURE — 74177 CT ABD & PELVIS W/CONTRAST: CPT

## 2023-12-16 PROCEDURE — 88304 TISSUE EXAM BY PATHOLOGIST: CPT | Performed by: PATHOLOGY

## 2023-12-16 PROCEDURE — 96361 HYDRATE IV INFUSION ADD-ON: CPT

## 2023-12-16 PROCEDURE — 81025 URINE PREGNANCY TEST: CPT

## 2023-12-16 PROCEDURE — 47562 LAPAROSCOPIC CHOLECYSTECTOMY: CPT | Performed by: SURGERY

## 2023-12-16 PROCEDURE — 99283 EMERGENCY DEPT VISIT LOW MDM: CPT | Performed by: SURGERY

## 2023-12-16 PROCEDURE — 93005 ELECTROCARDIOGRAM TRACING: CPT

## 2023-12-16 PROCEDURE — 99284 EMERGENCY DEPT VISIT MOD MDM: CPT

## 2023-12-16 PROCEDURE — 84484 ASSAY OF TROPONIN QUANT: CPT

## 2023-12-16 PROCEDURE — 96376 TX/PRO/DX INJ SAME DRUG ADON: CPT

## 2023-12-16 PROCEDURE — 81001 URINALYSIS AUTO W/SCOPE: CPT | Performed by: EMERGENCY MEDICINE

## 2023-12-16 PROCEDURE — 47562 LAPAROSCOPIC CHOLECYSTECTOMY: CPT

## 2023-12-16 PROCEDURE — 96365 THER/PROPH/DIAG IV INF INIT: CPT

## 2023-12-16 RX ORDER — OXYCODONE HYDROCHLORIDE 10 MG/1
10 TABLET ORAL EVERY 4 HOURS PRN
Status: DISCONTINUED | OUTPATIENT
Start: 2023-12-16 | End: 2023-12-16 | Stop reason: HOSPADM

## 2023-12-16 RX ORDER — HEPARIN SODIUM 5000 [USP'U]/ML
5000 INJECTION, SOLUTION INTRAVENOUS; SUBCUTANEOUS EVERY 8 HOURS SCHEDULED
Status: DISCONTINUED | OUTPATIENT
Start: 2023-12-16 | End: 2023-12-16 | Stop reason: HOSPADM

## 2023-12-16 RX ORDER — HYDROMORPHONE HCL/PF 1 MG/ML
0.5 SYRINGE (ML) INJECTION
Status: DISCONTINUED | OUTPATIENT
Start: 2023-12-16 | End: 2023-12-16 | Stop reason: HOSPADM

## 2023-12-16 RX ORDER — ACETAMINOPHEN 325 MG/1
650 TABLET ORAL EVERY 6 HOURS PRN
Status: DISCONTINUED | OUTPATIENT
Start: 2023-12-16 | End: 2023-12-16 | Stop reason: HOSPADM

## 2023-12-16 RX ORDER — FENTANYL CITRATE/PF 50 MCG/ML
50 SYRINGE (ML) INJECTION
Status: DISCONTINUED | OUTPATIENT
Start: 2023-12-16 | End: 2023-12-16 | Stop reason: HOSPADM

## 2023-12-16 RX ORDER — SODIUM CHLORIDE, SODIUM LACTATE, POTASSIUM CHLORIDE, CALCIUM CHLORIDE 600; 310; 30; 20 MG/100ML; MG/100ML; MG/100ML; MG/100ML
20 INJECTION, SOLUTION INTRAVENOUS CONTINUOUS
Status: DISCONTINUED | OUTPATIENT
Start: 2023-12-16 | End: 2023-12-16 | Stop reason: HOSPADM

## 2023-12-16 RX ORDER — ACETAMINOPHEN 325 MG/1
650 TABLET ORAL EVERY 4 HOURS PRN
Status: DISCONTINUED | OUTPATIENT
Start: 2023-12-16 | End: 2023-12-16

## 2023-12-16 RX ORDER — LIDOCAINE HYDROCHLORIDE 20 MG/ML
INJECTION, SOLUTION EPIDURAL; INFILTRATION; INTRACAUDAL; PERINEURAL AS NEEDED
Status: DISCONTINUED | OUTPATIENT
Start: 2023-12-16 | End: 2023-12-16

## 2023-12-16 RX ORDER — SUMATRIPTAN 25 MG/1
100 TABLET, FILM COATED ORAL ONCE AS NEEDED
Status: DISCONTINUED | OUTPATIENT
Start: 2023-12-16 | End: 2023-12-16 | Stop reason: HOSPADM

## 2023-12-16 RX ORDER — VENLAFAXINE HYDROCHLORIDE 75 MG/1
75 CAPSULE, EXTENDED RELEASE ORAL DAILY
Status: DISCONTINUED | OUTPATIENT
Start: 2023-12-16 | End: 2023-12-16

## 2023-12-16 RX ORDER — SODIUM CHLORIDE, SODIUM GLUCONATE, SODIUM ACETATE, POTASSIUM CHLORIDE, MAGNESIUM CHLORIDE, SODIUM PHOSPHATE, DIBASIC, AND POTASSIUM PHOSPHATE .53; .5; .37; .037; .03; .012; .00082 G/100ML; G/100ML; G/100ML; G/100ML; G/100ML; G/100ML; G/100ML
125 INJECTION, SOLUTION INTRAVENOUS CONTINUOUS
Status: DISCONTINUED | OUTPATIENT
Start: 2023-12-16 | End: 2023-12-16 | Stop reason: HOSPADM

## 2023-12-16 RX ORDER — ONDANSETRON 2 MG/ML
4 INJECTION INTRAMUSCULAR; INTRAVENOUS ONCE
Status: COMPLETED | OUTPATIENT
Start: 2023-12-16 | End: 2023-12-16

## 2023-12-16 RX ORDER — ROCURONIUM BROMIDE 10 MG/ML
INJECTION, SOLUTION INTRAVENOUS AS NEEDED
Status: DISCONTINUED | OUTPATIENT
Start: 2023-12-16 | End: 2023-12-16

## 2023-12-16 RX ORDER — PROPOFOL 10 MG/ML
INJECTION, EMULSION INTRAVENOUS AS NEEDED
Status: DISCONTINUED | OUTPATIENT
Start: 2023-12-16 | End: 2023-12-16

## 2023-12-16 RX ORDER — OXYCODONE HYDROCHLORIDE 5 MG/1
5 TABLET ORAL EVERY 4 HOURS PRN
Status: DISCONTINUED | OUTPATIENT
Start: 2023-12-16 | End: 2023-12-16 | Stop reason: HOSPADM

## 2023-12-16 RX ORDER — KETOROLAC TROMETHAMINE 30 MG/ML
INJECTION, SOLUTION INTRAMUSCULAR; INTRAVENOUS AS NEEDED
Status: DISCONTINUED | OUTPATIENT
Start: 2023-12-16 | End: 2023-12-16

## 2023-12-16 RX ORDER — LABETALOL HYDROCHLORIDE 5 MG/ML
20 INJECTION, SOLUTION INTRAVENOUS EVERY 4 HOURS PRN
Status: DISCONTINUED | OUTPATIENT
Start: 2023-12-16 | End: 2023-12-16 | Stop reason: HOSPADM

## 2023-12-16 RX ORDER — MORPHINE SULFATE 4 MG/ML
4 INJECTION, SOLUTION INTRAMUSCULAR; INTRAVENOUS ONCE
Status: COMPLETED | OUTPATIENT
Start: 2023-12-16 | End: 2023-12-16

## 2023-12-16 RX ORDER — ONDANSETRON 2 MG/ML
4 INJECTION INTRAMUSCULAR; INTRAVENOUS ONCE AS NEEDED
Status: DISCONTINUED | OUTPATIENT
Start: 2023-12-16 | End: 2023-12-16 | Stop reason: HOSPADM

## 2023-12-16 RX ORDER — FENTANYL CITRATE 50 UG/ML
INJECTION, SOLUTION INTRAMUSCULAR; INTRAVENOUS AS NEEDED
Status: DISCONTINUED | OUTPATIENT
Start: 2023-12-16 | End: 2023-12-16

## 2023-12-16 RX ORDER — HYDROMORPHONE HCL/PF 1 MG/ML
SYRINGE (ML) INJECTION AS NEEDED
Status: DISCONTINUED | OUTPATIENT
Start: 2023-12-16 | End: 2023-12-16

## 2023-12-16 RX ORDER — CEFAZOLIN SODIUM 2 G/50ML
2000 SOLUTION INTRAVENOUS
Status: DISCONTINUED | OUTPATIENT
Start: 2023-12-16 | End: 2023-12-16 | Stop reason: HOSPADM

## 2023-12-16 RX ORDER — MAGNESIUM HYDROXIDE 1200 MG/15ML
LIQUID ORAL AS NEEDED
Status: DISCONTINUED | OUTPATIENT
Start: 2023-12-16 | End: 2023-12-16 | Stop reason: HOSPADM

## 2023-12-16 RX ORDER — SODIUM CHLORIDE, SODIUM LACTATE, POTASSIUM CHLORIDE, CALCIUM CHLORIDE 600; 310; 30; 20 MG/100ML; MG/100ML; MG/100ML; MG/100ML
INJECTION, SOLUTION INTRAVENOUS CONTINUOUS PRN
Status: DISCONTINUED | OUTPATIENT
Start: 2023-12-16 | End: 2023-12-16

## 2023-12-16 RX ORDER — ONDANSETRON 2 MG/ML
INJECTION INTRAMUSCULAR; INTRAVENOUS AS NEEDED
Status: DISCONTINUED | OUTPATIENT
Start: 2023-12-16 | End: 2023-12-16

## 2023-12-16 RX ORDER — BUPIVACAINE HYDROCHLORIDE 2.5 MG/ML
INJECTION, SOLUTION EPIDURAL; INFILTRATION; INTRACAUDAL AS NEEDED
Status: DISCONTINUED | OUTPATIENT
Start: 2023-12-16 | End: 2023-12-16 | Stop reason: HOSPADM

## 2023-12-16 RX ORDER — SODIUM CHLORIDE 9 MG/ML
INJECTION, SOLUTION INTRAVENOUS AS NEEDED
Status: DISCONTINUED | OUTPATIENT
Start: 2023-12-16 | End: 2023-12-16 | Stop reason: HOSPADM

## 2023-12-16 RX ORDER — OXYCODONE HYDROCHLORIDE AND ACETAMINOPHEN 5; 325 MG/1; MG/1
1 TABLET ORAL EVERY 4 HOURS PRN
Qty: 10 TABLET | Refills: 0 | Status: SHIPPED | OUTPATIENT
Start: 2023-12-16

## 2023-12-16 RX ORDER — ONDANSETRON 2 MG/ML
4 INJECTION INTRAMUSCULAR; INTRAVENOUS EVERY 6 HOURS PRN
Status: DISCONTINUED | OUTPATIENT
Start: 2023-12-16 | End: 2023-12-16 | Stop reason: HOSPADM

## 2023-12-16 RX ORDER — MIDAZOLAM HYDROCHLORIDE 2 MG/2ML
INJECTION, SOLUTION INTRAMUSCULAR; INTRAVENOUS AS NEEDED
Status: DISCONTINUED | OUTPATIENT
Start: 2023-12-16 | End: 2023-12-16

## 2023-12-16 RX ORDER — ONDANSETRON 2 MG/ML
4 INJECTION INTRAMUSCULAR; INTRAVENOUS EVERY 4 HOURS PRN
Status: DISCONTINUED | OUTPATIENT
Start: 2023-12-16 | End: 2023-12-16 | Stop reason: HOSPADM

## 2023-12-16 RX ORDER — PANTOPRAZOLE SODIUM 40 MG/1
40 TABLET, DELAYED RELEASE ORAL
Status: DISCONTINUED | OUTPATIENT
Start: 2023-12-16 | End: 2023-12-16 | Stop reason: HOSPADM

## 2023-12-16 RX ORDER — HYDROCODONE BITARTRATE AND ACETAMINOPHEN 5; 325 MG/1; MG/1
1 TABLET ORAL EVERY 6 HOURS PRN
Status: DISCONTINUED | OUTPATIENT
Start: 2023-12-16 | End: 2023-12-16 | Stop reason: HOSPADM

## 2023-12-16 RX ADMIN — MORPHINE SULFATE 4 MG: 4 INJECTION INTRAVENOUS at 06:56

## 2023-12-16 RX ADMIN — ONDANSETRON 4 MG: 2 INJECTION INTRAMUSCULAR; INTRAVENOUS at 13:42

## 2023-12-16 RX ADMIN — SODIUM CHLORIDE 1000 ML: 0.9 INJECTION, SOLUTION INTRAVENOUS at 04:35

## 2023-12-16 RX ADMIN — IOHEXOL 80 ML: 350 INJECTION, SOLUTION INTRAVENOUS at 05:22

## 2023-12-16 RX ADMIN — KETOROLAC TROMETHAMINE 15 MG: 30 INJECTION, SOLUTION INTRAMUSCULAR; INTRAVENOUS at 13:57

## 2023-12-16 RX ADMIN — SUGAMMADEX 200 MG: 100 INJECTION, SOLUTION INTRAVENOUS at 14:22

## 2023-12-16 RX ADMIN — ROCURONIUM BROMIDE 50 MG: 10 INJECTION, SOLUTION INTRAVENOUS at 13:42

## 2023-12-16 RX ADMIN — PANTOPRAZOLE SODIUM 40 MG: 40 TABLET, DELAYED RELEASE ORAL at 10:58

## 2023-12-16 RX ADMIN — SODIUM CHLORIDE, SODIUM LACTATE, POTASSIUM CHLORIDE, AND CALCIUM CHLORIDE: .6; .31; .03; .02 INJECTION, SOLUTION INTRAVENOUS at 13:39

## 2023-12-16 RX ADMIN — MIDAZOLAM 2 MG: 1 INJECTION INTRAMUSCULAR; INTRAVENOUS at 13:36

## 2023-12-16 RX ADMIN — PROPOFOL 200 MG: 10 INJECTION, EMULSION INTRAVENOUS at 13:42

## 2023-12-16 RX ADMIN — SODIUM CHLORIDE, SODIUM GLUCONATE, SODIUM ACETATE, POTASSIUM CHLORIDE, MAGNESIUM CHLORIDE, SODIUM PHOSPHATE, DIBASIC, AND POTASSIUM PHOSPHATE 125 ML/HR: .53; .5; .37; .037; .03; .012; .00082 INJECTION, SOLUTION INTRAVENOUS at 10:58

## 2023-12-16 RX ADMIN — ONDANSETRON 4 MG: 2 INJECTION INTRAMUSCULAR; INTRAVENOUS at 04:37

## 2023-12-16 RX ADMIN — Medication 20 MG: at 14:59

## 2023-12-16 RX ADMIN — MORPHINE SULFATE 4 MG: 4 INJECTION INTRAVENOUS at 04:37

## 2023-12-16 RX ADMIN — LIDOCAINE HYDROCHLORIDE 80 MG: 20 INJECTION, SOLUTION EPIDURAL; INFILTRATION; INTRACAUDAL; PERINEURAL at 13:42

## 2023-12-16 RX ADMIN — FENTANYL CITRATE 50 MCG: 50 INJECTION INTRAMUSCULAR; INTRAVENOUS at 13:42

## 2023-12-16 RX ADMIN — HYDROMORPHONE HYDROCHLORIDE 0.5 MG: 1 INJECTION, SOLUTION INTRAMUSCULAR; INTRAVENOUS; SUBCUTANEOUS at 14:00

## 2023-12-16 RX ADMIN — CEFAZOLIN SODIUM 2000 MG: 2 SOLUTION INTRAVENOUS at 13:39

## 2023-12-16 NOTE — ED CARE HANDOFF
Emergency Department Sign Out Note        Sign out and transfer of care from Bhavya Beckwith NP. See Separate Emergency Department note.     The patient, Shavonne Campos, was evaluated by the previous provider for abdominal pain.    Workup Completed:  Labs, UA, CT a/p.    ED Course / Workup Pending (followup):  RUQ (GB) US pending, and surgery consult.  US shows small nonmobile gallstone in the neck of the gallbladder.  Surgery resident Dr Beach evaluating.  Pt states pain has resolved at this point, and on my exam, no abdominal tenderness elicited in the epigastric, or RUQ.    Surgery decided to take to OR for cholecystectomy given symptomatic cholelithiasis.                                  ED Course as of 12/16/23 0929   Sat Dec 16, 2023   0823 Received pt in sign out from Bhavya Beckwith NP, pending US RUQ and gen surgery evaluation.     Procedures  Medical Decision Making  Amount and/or Complexity of Data Reviewed  Labs: ordered.  Radiology: ordered and independent interpretation performed.    Risk  Prescription drug management.  Decision regarding hospitalization.            Disposition  Final diagnoses:   Epigastric abdominal pain   Transaminitis     Time reflects when diagnosis was documented in both MDM as applicable and the Disposition within this note       Time User Action Codes Description Comment    12/16/2023  7:16 AM Bhavya Beckwith Add [R10.13] Epigastric abdominal pain     12/16/2023  7:16 AM Bhavya Beckwith Add [R74.01] Transaminitis           ED Disposition       None          Follow-up Information    None       Patient's Medications   Discharge Prescriptions    No medications on file     No discharge procedures on file.       ED Provider  Electronically Signed by     Willard Valencia DO  12/16/23 5045

## 2023-12-16 NOTE — ED PROVIDER NOTES
History  Chief Complaint   Patient presents with    Abdominal Pain     Pt reports midepigastric pain starting last night after dinner with N/HA. -V/D/CP. Pt took 2 tylenol and protonix around 0100 with no relief.      The patient is a 48-year-old female with PMH of migraine headaches, GERD, and subclinical hyperthyroidism presenting for evaluation of 1 day of abdominal pain with associated nausea and vomiting.  The patient started after dinnertime at approximately 8:30/9 PM (6 hours PTA) with epigastric abdominal pain described as constant, squeezing, and nonradiating.  The patient notes taking a dose of Tylenol at the onset of pain with no relief.  She took a second dose 3 hours ago and again had no improvement.  She also took a pantoprazole at that time and continues to feel severe epigastric abdominal pain.  When asked if she is felt this pain before, the patient's daughter notes she has sometimes felt this way but never to this extent or for this long (especially with no relief from Tylenol and pantoprazole per daughter).  She developed nausea and has vomited 3 times (undigested food, NBNB).  She denies associated fevers, chills, diarrhea, urinary urgency/frequency, foul-smelling urine, hematuria, and dysuria.  She denies associated CP/SOB, palpitations, headache, lightheadedness/dizziness, and weakness.  She denies history of prior abdominal surgeries.  On review of her chart, it is with a note of prior right upper quadrant ultrasound with gallstones.  It was charted that she should follow-up with general surgery, however she was never scheduled and subsequently has not had follow-up for this finding.  The patient does not drink EtOH and does not smoke cigarettes.  She denies all other recreational drug use.  She notes pain like this in the past but denies ever having the thing this severe or this persistent.      History provided by:  Patient and relative (Patient's daughter)   used: No  (Offered, patient's daughter at bedside and patient note they feel comfortable with her daughter translating)        Prior to Admission Medications   Prescriptions Last Dose Informant Patient Reported? Taking?   Cholecalciferol (Vitamin D3) 50 MCG (2000 UT) capsule  Child No No   Sig: Take 1.5 capsules (3,000 Units total) by mouth daily   omeprazole (PriLOSEC) 40 MG capsule   No No   Sig: TAKE 1 CAPSULE TWICE A DAY   rizatriptan (Maxalt) 10 mg tablet   No No   Sig: Take 1 tablet (10 mg total) by mouth as needed for migraine Take at the onset of migraine; if symptoms continue or return, may take another dose at least 2 hours after first dose. Take no more than 2 doses in a day.   Patient not taking: Reported on 9/18/2023   venlafaxine (EFFEXOR-XR) 37.5 mg 24 hr capsule   No No   Sig: Take 1 capsule (37.5 mg total) by mouth daily for 7 days Then increase to 75 mg daily   venlafaxine (EFFEXOR-XR) 75 mg 24 hr capsule   No No   Sig: TAKE 1 CAPSULE BY MOUTH EVERY DAY      Facility-Administered Medications: None       Past Medical History:   Diagnosis Date    Disease of thyroid gland     Migraine        History reviewed. No pertinent surgical history.    Family History   Problem Relation Age of Onset    Diabetes type II Mother     Diabetes type II Father     Heart disease Father     Thyroid disease Sister     Colon cancer Maternal Grandmother     Other Daughter      I have reviewed and agree with the history as documented.    E-Cigarette/Vaping    E-Cigarette Use Never User      E-Cigarette/Vaping Substances    Nicotine No     THC No     CBD No     Flavoring No     Unknown No      Social History     Tobacco Use    Smoking status: Never    Smokeless tobacco: Never   Vaping Use    Vaping status: Never Used   Substance Use Topics    Alcohol use: Never    Drug use: Never       Review of Systems   Constitutional:  Positive for appetite change (Decreased). Negative for chills and fever.   HENT: Negative.     Eyes:  Negative for  pain and visual disturbance.   Respiratory:  Negative for cough and shortness of breath.    Cardiovascular:  Negative for chest pain, palpitations and leg swelling.   Gastrointestinal:  Positive for abdominal pain (Epigastric abdominal pain x 6 hours), nausea and vomiting. Negative for abdominal distention, anal bleeding, blood in stool, constipation (Last BM at 1 AM (3 hours PTA), soft brown, without blood or melena), diarrhea and rectal pain.   Genitourinary: Negative.    Musculoskeletal:  Negative for back pain and gait problem.   Skin:  Negative for color change, pallor, rash and wound.   Allergic/Immunologic: Negative for immunocompromised state.   Neurological:  Negative for dizziness, syncope, weakness, light-headedness and headaches.   All other systems reviewed and are negative.      Physical Exam  Physical Exam  Vitals and nursing note reviewed.   Constitutional:       General: She is awake. She is in acute distress (Evidencing acute discomfort and distress).      Appearance: Normal appearance. She is well-developed. She is not ill-appearing, toxic-appearing or diaphoretic.   HENT:      Head: Normocephalic and atraumatic.      Jaw: There is normal jaw occlusion.      Nose: Nose normal.      Mouth/Throat:      Lips: Pink. No lesions.      Mouth: Mucous membranes are moist.      Pharynx: Oropharynx is clear. Uvula midline.   Eyes:      General: Lids are normal. Vision grossly intact. Gaze aligned appropriately.      Extraocular Movements: Extraocular movements intact.      Conjunctiva/sclera: Conjunctivae normal.   Neck:      Trachea: Phonation normal. No abnormal tracheal secretions.   Cardiovascular:      Rate and Rhythm: Normal rate and regular rhythm.      Pulses:           Radial pulses are 2+ on the right side and 2+ on the left side.        Dorsalis pedis pulses are 2+ on the right side and 2+ on the left side.        Posterior tibial pulses are 2+ on the right side and 2+ on the left side.      Heart  sounds: Normal heart sounds, S1 normal and S2 normal. No murmur heard.  Pulmonary:      Effort: Pulmonary effort is normal. No tachypnea or respiratory distress.      Breath sounds: Normal breath sounds and air entry. No stridor, decreased air movement or transmitted upper airway sounds. No decreased breath sounds.   Abdominal:      General: Bowel sounds are normal. There is no distension.      Palpations: Abdomen is soft.      Tenderness: There is abdominal tenderness in the epigastric area. There is no right CVA tenderness, left CVA tenderness, guarding or rebound. Negative signs include Lehman's sign.   Musculoskeletal:         General: Normal range of motion.      Cervical back: Neck supple.      Right lower leg: No edema.      Left lower leg: No edema.      Comments: PAREDES, 5/5 strength throughout, sensation intact, no focal joint swelling. Ambulatory with steady gait.   Skin:     General: Skin is warm and dry.      Capillary Refill: Capillary refill takes less than 2 seconds.      Findings: No rash or wound.   Neurological:      General: No focal deficit present.      Mental Status: She is alert and oriented to person, place, and time. Mental status is at baseline.      GCS: GCS eye subscore is 4. GCS verbal subscore is 5. GCS motor subscore is 6.   Psychiatric:         Behavior: Behavior is cooperative.         Vital Signs  ED Triage Vitals   Temperature Pulse Respirations Blood Pressure SpO2   12/16/23 0410 12/16/23 0410 12/16/23 0410 12/16/23 0410 12/16/23 0410   97.9 °F (36.6 °C) 61 20 (!) 184/92 100 %      Temp Source Heart Rate Source Patient Position - Orthostatic VS BP Location FiO2 (%)   12/16/23 0410 12/16/23 0410 12/16/23 0410 12/16/23 0410 --   Oral Monitor Sitting Right arm       Pain Score       12/16/23 0615       3           Vitals:    12/16/23 0410 12/16/23 0500 12/16/23 0615   BP: (!) 184/92 169/80 137/77   Pulse: 61 59 62   Patient Position - Orthostatic VS: Sitting  Lying         Visual  Acuity      ED Medications  Medications   sodium chloride 0.9 % bolus 1,000 mL (1,000 mL Intravenous New Bag 12/16/23 0435)   ondansetron (ZOFRAN) injection 4 mg (4 mg Intravenous Given 12/16/23 0437)   morphine injection 4 mg (4 mg Intravenous Given 12/16/23 0437)   morphine injection 4 mg (4 mg Intravenous Given 12/16/23 0656)   iohexol (OMNIPAQUE) 350 MG/ML injection (MULTI-DOSE) 80 mL (80 mL Intravenous Given 12/16/23 0522)       Diagnostic Studies  Results Reviewed       Procedure Component Value Units Date/Time    Urine Microscopic [[id number]]  (Abnormal) Collected: 12/16/23 0603    Lab Status: Final result Specimen: Urine, Clean Catch Updated: 12/16/23 0700     RBC, UA 1-2 /hpf      WBC, UA 2-4 /hpf      Epithelial Cells Occasional /hpf      Bacteria, UA None Seen /hpf      MUCUS THREADS Occasional    UA w Reflex to Microscopic w Reflex to Culture [[id number]]  (Abnormal) Collected: 12/16/23 0603    Lab Status: Final result Specimen: Urine, Clean Catch Updated: 12/16/23 0651     Color, UA Yellow     Clarity, UA Clear     Specific Gravity, UA 1.033     pH, UA 6.0     Leukocytes, UA Small     Nitrite, UA Negative     Protein, UA 30 (1+) mg/dl      Glucose, UA Negative mg/dl      Ketones, UA Negative mg/dl      Urobilinogen, UA 2.0 mg/dl      Bilirubin, UA Negative     Occult Blood, UA Negative    POCT pregnancy, urine [[id number]]  (Normal) Resulted: 12/16/23 0533    Lab Status: Final result Updated: 12/16/23 0533     EXT Preg Test, Ur Negative     Control Valid    HS Troponin 0hr (reflex protocol) [[id number]]  (Normal) Collected: 12/16/23 0435    Lab Status: Final result Specimen: Blood from Arm, Right Updated: 12/16/23 0521     hs TnI 0hr 2 ng/L     Lactic acid, plasma (w/reflex if result > 2.0) [[id number]]  (Normal) Collected: 12/16/23 0435    Lab Status: Final result Specimen: Blood from Arm, Right Updated: 12/16/23 0515     LACTIC ACID 1.5 mmol/L     Narrative:      Result may be elevated if tourniquet was  used during collection.    Comprehensive metabolic panel [[id number]]  (Abnormal) Collected: 12/16/23 0435    Lab Status: Final result Specimen: Blood from Arm, Right Updated: 12/16/23 0511     Sodium 138 mmol/L      Potassium 4.0 mmol/L      Chloride 103 mmol/L      CO2 27 mmol/L      ANION GAP 8 mmol/L      BUN 25 mg/dL      Creatinine 0.76 mg/dL      Glucose 118 mg/dL      Calcium 9.1 mg/dL       U/L       U/L      Alkaline Phosphatase 113 U/L      Total Protein 7.6 g/dL      Albumin 4.3 g/dL      Total Bilirubin 0.68 mg/dL      eGFR 91 ml/min/1.73sq m     Narrative:      National Kid[employer] guidelines for Chronic Kidney Disease (CKD):     Stage 1 with normal or high GFR (GFR > 90 mL/min/1.73 square meters)    Stage 2 Mild CKD (GFR = 60-89 mL/min/1.73 square meters)    Stage 3A Moderate CKD (GFR = 45-59 mL/min/1.73 square meters)    Stage 3B Moderate CKD (GFR = 30-44 mL/min/1.73 square meters)    Stage 4 Severe CKD (GFR = 15-29 mL/min/1.73 square meters)    Stage 5 End Stage CKD (GFR <15 mL/min/1.73 square meters)  Note: GFR calculation is accurate only with a steady state creatinine    Lipase [[id number]]  (Normal) Collected: 12/16/23 0435    Lab Status: Final result Specimen: Blood from Arm, Right Updated: 12/16/23 0511     Lipase 64 u/L     CBC and differential [[id number]]  (Abnormal) Collected: 12/16/23 0435    Lab Status: Final result Specimen: Blood from Arm, Right Updated: 12/16/23 0503     WBC 6.78 Thousand/uL      RBC 4.59 Million/uL      Hemoglobin 9.5 g/dL      Hematocrit 33.2 %      MCV 72 fL      MCH 20.7 pg      MCHC 28.6 g/dL      RDW 23.2 %      MPV 9.0 fL      Platelets 274 Thousands/uL      nRBC 0 /100 WBCs      Neutrophils Relative 78 %      Immat GRANS % 0 %      Lymphocytes Relative 17 %      Monocytes Relative 4 %      Eosinophils Relative 1 %      Basophils Relative 0 %      Neutrophils Absolute 5.24 Thousands/µL      Immature Grans Absolute 0.02 Thousand/uL       Lymphocytes Absolute 1.17 Thousands/µL      Monocytes Absolute 0.29 Thousand/µL      Eosinophils Absolute 0.04 Thousand/µL      Basophils Absolute 0.02 Thousands/µL                    CT abdomen pelvis with contrast   Final Result by Arthur Garcia MD (12/16 1774)      No evidence of acute abdominopelvic process.      Cholelithiasis.      Additional chronic findings and negatives as above.                  Workstation performed: [id number]         XR chest 2 views   ED Interpretation by TWILA Ford (12/16 6802)   No acute cardiopulmonary disease identified by me.      US right upper quadrant    (Results Pending)              Procedures  ECG 12 Lead Documentation Only    Date/Time: 12/16/2023 4:27 AM    Performed by: TWILA Ford  Authorized by: TWILA Ford    Indications / Diagnosis:  Epigastric abdominal pain  ECG reviewed by me, the ED Provider: yes    Patient location:  ED  Previous ECG:     Previous ECG:  Compared to current    Comparison ECG info:  September 20, 2022    Similarity:  Changes noted (Previous T wave inversion in lead 3 and flattening of aVF with now upright T waves)    Comparison to cardiac monitor: Yes    Interpretation:     Interpretation: normal    Rate:     ECG rate:  63    ECG rate assessment: normal    Rhythm:     Rhythm: sinus rhythm    Ectopy:     Ectopy: none    QRS:     QRS axis:  Normal    QRS intervals:  Normal  Conduction:     Conduction: normal    ST segments:     ST segments:  Normal  T waves:     T waves: normal    Comments:      Sinus rhythm, normal axis, normal intervals, no acute ischemic changes read by me           ED Course  ED Course as of 12/16/23 0722   Sat Dec 16, 2023   0504 CBC and differential(!)  Microcytic, hypochromic anemia   0504 Hemoglobin(!): 9.5  Last measure of 9.9, 1-year ago, no acute change from last measure.  Patient notes ongoing menses with last very light x 3 days and ending 2 days ago.  However, last month she did note  bleeding small amount for 2 weeks.  Denies blood and melanotic stools.  Differential consistent with iron deficiency anemia.   0505 WBC: 6.78  No leukocytosis.  Patient denies fevers and chills.  Infectious etiology less likely   0513 Comprehensive metabolic panel(!)  No electrolyte derangement, no CAL, normal random glucose, elevated AST and ALT without elevated total bili.  Obtain CT abdomen pelvis for further evaluation of acute epigastric pain.   0514 Lipase: 64  WNL, denies alcohol and smoking history, pancreatitis less likely   0516 LACTIC ACID: 1.5  WNL   0521 hs TnI 0hr: 2  ACS less likely given epigastric abd pain x6 hours, will dc 2 and 4 h troponins   0530 On reevaluation, patient with improved pain.  She continues with moderate epigastric abdominal pain for which repeat dosing of morphine placed.  Patient notes she would like to hold off at this time.  CT imaging just obtained.  I updated the patient and her daughter on elevated LFTs which may be related to her acute pain this evening.  Given her history of gallstones, suspicion remains highest for hepatobiliary pathology.   0540 PREGNANCY TEST URINE: Negative  Noted negative   0647 CT abdomen pelvis with contrast  IMPRESSION:     No evidence of acute abdominopelvic process.     Cholelithiasis.        0650 Patient and her daughter updated on CT results.  Patient notes the pain is started to come back for which she asked for subsequent dosing of morphine.  Second order of 4 mg morphine never administered, will notify bedside RN Yunier and have him give now.  Given her new transaminitis, will discuss with [facility] for admission for need of pain control and GI consult   0653 UA w Reflex to Microscopic w Reflex to Culture(!)  Will await urine micro   0703 Urine Microscopic(!)  No evidence of infection   0721 Surgery consulted and RUQ US ordered   0721 Patient signed out to oncoming doctor Dr. Valencia.  Workup pending right upper quadrant ultrasound and surgery  consult.                                             Medical Decision Making  DDx including but not limited to: Cholecystitis, pancreatitis, GERD, gastritis, atypical ACS    The patient is a 48-year-old female with no prior surgical abdominal history presenting with acute epigastric abdominal pain with associated nausea and vomiting.  On review of her chart, she was with note of gallstones earlier this year on right upper quadrant ultrasound.  She never followed up with surgery regarding that issue.  She notes intermittently, and infrequently, having upper abdominal pain both with relation and and no relation to food intake.  This evening, after having dinner, she had an acute onset of pain which is more severe than typical episodes.  She is hypertensive on arrival which may be secondary to severe pain.  All other vital signs are within normal limits and stable.  Her exam is with focal epigastric abdominal tenderness.  No reproducible chest tenderness.  No rash or overlying skin changes.  Given significant pain, EKG obtained to rule out atypical ACS.  Troponin sent and pending.  Will obtain labs to further evaluate for electrolyte derangement, organ dysfunction, infection, and anemia.  Ending results, most likely plan of action for CT abdomen pelvis with contrast.  Will medicate with IV fluids, Zofran, and morphine.  The patient and her daughter are in agreement with plan and have no further questions at this time.    See ED course for further MDM and disposition discussion.        Problems Addressed:  Epigastric abdominal pain: acute illness or injury  Transaminitis: acute illness or injury    Amount and/or Complexity of Data Reviewed  Labs: ordered. Decision-making details documented in ED Course.  Radiology: ordered and independent interpretation performed. Decision-making details documented in ED Course.  ECG/medicine tests: ordered and independent interpretation performed.    Risk  OTC drugs.  Prescription drug  management.  Parenteral controlled substances.             Disposition  Final diagnoses:   Epigastric abdominal pain   Transaminitis     Time reflects when diagnosis was documented in both MDM as applicable and the Disposition within this note       Time User Action Codes Description Comment    12/16/2023  7:16 AM Bhavya Beckwith [R10.13] Epigastric abdominal pain     12/16/2023  7:16 AM Bhavya Beckwith [R74.01] Transaminitis           ED Disposition       None          Follow-up Information    None         Patient's Medications   Discharge Prescriptions    No medications on file       No discharge procedures on file.    PDMP Review         Value Time User    PDMP Reviewed  Yes 12/16/2023  4:06 AM Huy Sheehan MD            ED Provider  Electronically Signed by             TWILA Ford  12/16/23 0722

## 2023-12-16 NOTE — ASSESSMENT & PLAN NOTE
Pt gets SOB for 1 year climbing a flight of stairs, so 4 METs.  Sounds like surgery should be performed in next 6 weeks, and not a major surgery so moderate but acceptable risk.  Benefit of cholecystectomy outweighs risk of waiting  Note pt uses auto-CPAP qhs

## 2023-12-16 NOTE — OP NOTE
OPERATIVE REPORT  PATIENT NAME: Shavonne Campos    :  1975  MRN: [id number]  Pt Location: AN OR ROOM 04    SURGERY DATE: 2023    Surgeons and Role:     * Sang Goodwin MD - Primary     * Dev Patrick MD - Assisting    Preop Diagnosis:  Epigastric abdominal pain [R10.13]    Post-Op Diagnosis Codes:     * Epigastric abdominal pain [R10.13]        Acute calculus cholecystitis      Procedure(s):  CHOLECYSTECTOMY LAPAROSCOPIC    Specimen(s):  ID Type Source Tests Collected by Time Destination   1 :  Tissue Gallbladder TISSUE EXAM Sang Goodwin MD 2023  2:02 PM        Estimated Blood Loss:   Minimal    Drains:  * No LDAs found *    Anesthesia Type:   Choice    Operative Indications:  Epigastric abdominal pain [R10.13]    Independent, non-smoker, ASA 2, wound class II, BMI 33, weight 160, height 60    LEW (dyspnea on exertion)    (+) Hypertension   (+) Migraine without aura and without status migrainosus, not intractable       ENDO   (+) Hyperthyroidism       GI/HEPATIC   (+) Gastroesophageal reflux disease without esophagitis       NEURO/PSYCH   (+) Anxiety   (+) Migraine without aura and without status migrainosus, not intractable   (+) New daily persistent headache       PULMONARY   (+) LEW (dyspnea on exertion)   (+) RAJESH (obstructive sleep apnea)       Cardiovascular and Mediastinum   (+) Acquired pericardial cyst       Other   (+) Abnormal thyroid function test   (+) Thyroid antibody posit         Complications:   None    Procedure and Technique:  Shavonne Campos is a 48 y.o. female was brought into the operative suite and identified visually and by arm band. The patient was placed in the supine position.  Careful attention towards positioning of extremities was completed.  After sterile prep and drape a timeout was completed. Athrombic pumps in place.  Antibiotics provided.    After instillation of local analgesia an incision was made at the umbilicus . With blunt dissection the  peritoneum was identified.  This was pulled upward using Kocher clamps.  An incision was made.  Hemostat was used to bluntly puncture the peritoneum.  Intra-abdominal location was verified.  A trocar was then inserted. CO2 was then insufflated with a back pressure of 15. After Marcaine instillation at each additional site, additional trochars are placed under direct vision into the upper aspect of the abdomen.    Laparoscopic visualization revealed a normal liver and normal stomach. excess peritoneal fluid.  Was present indicative of acute calculus cholecystitis                the gallbladder was pulled with traction inferiorly, and the liver was pushed superior.  A dome down technique was completed using electrocautery. This technique carried down to the level of Thomas's pouch.  Careful attention was made towards location of the right hepatic artery, cystic artery, common bile duct, right hepatic duct and the cystic duct.    Careful attention was made towards the critical anatomy at this region. The cystic duct was identified inserting into the base of the gallbladder.  Cystic artery was identified also inserting into the base of the gallbladder.  The cystic duct was then clipped ×3 and divided. The cystic artery was clipped ×3 and divided. The gallbladder was then removed from the liver bed with additional electrocautery.    The area was copiously irrigated. Hemostasis was assured.  The gallbladder was placed into an Endo-Catch bag, and was then removed through the umbilical incision.      Fascia was closed with 0 Vicryl suture.  The subcutaneous components were irrigated.  The subcuticular incisions were then closed. Histacryl was then applied. The patient was awakened from general anesthesia and transferred to the recovery room in stable condition. Sponge and instrument count correct ×2.  A postoperative deep briefing was completed.       I was present for the entire procedure.    Patient Disposition:  PACU          SIGNATURE: Sang Goodwin MD  DATE: December 16, 2023  TIME: 2:35 PM

## 2023-12-16 NOTE — ANESTHESIA POSTPROCEDURE EVALUATION
Post-Op Assessment Note    CV Status:  Stable  Pain Score: 0    Pain management: adequate       Mental Status:  Alert and awake   Hydration Status:  Euvolemic   PONV Controlled:  Controlled   Airway Patency:  Patent  Airway: intubated     Post Op Vitals Reviewed: Yes      Staff: CRNA               BP   184/86   Temp 97.5 °F (36.4 °C) (12/16/23 1438)    Pulse 66 (12/16/23 1438)   Resp 18 (12/16/23 1438)    SpO2 100 % (12/16/23 1438)

## 2023-12-16 NOTE — H&P
Consultation - General Surgery  Shavonne Campos 48 y.o. female MRN: [id number]  Unit/Bed#: ED-03 Encounter: [id number]        Assessment/Plan     Assessment:  49 yo female who presents with abdominal pain most likely in the setting of symptomatic cholelithiasis, pain possibly 2/2 pericardial cyst however less likely    Plan:  NPO  IV fluids  No IV abx needed now, no signs of systemic infection, WBC normal, afebrile  Will need ABX within 60 minutes of incision  PRN pain meds  PRN anti nausea meds  DVT prophylaxis  Medicine consult for per-op clearence given large pericardial cyst  Admit to surgical service  Plan for laparoscopic cholecystectomy today  Given patient has had epigastric like pain in the setting of a non mobile stone at the neck of the gallbladder, best to proceed with laparoscopic cholecystectomy during admission.     History of Present Illness     HPI:  Shavonne Campos is a 48 y.o. female who presents with abdominal pain. History mainly obtained from the patient's family as she is not english speaking. The family states her abdominal pain started last night at 9PM. Patient has had nausea and vomiting. No fevers or chills. History of vomiting 5-10 minutes after meals. Has been seen by GI in the outpatient setting has epigastric like symptoms being treated with omeprazole. Patient was referred to outpatient surgery clinic for laparoscopic cholecystectomy given findings of non mobile stone in neck of gallbladder on RUQ US obtained 03/2023. Patient's last meal was last night. Has had similar symptoms in the past but pain is worse this time. Having bowel movements, passing flatus, voiding without difficulty. Currently afebrile with stable vitals. Was hypertensive to systolic's of 180's/. No leukocytosis. T bili normal at 0.68. LFTs elevated to AST of 179, ALT of 110, and alk phos of 113. Lactic is within normal limits. 12/16 CT AP with contrast demonstrated: Cholelithiasis. No pericholecystitic fluid. No GB  wall thickening. 12/16 RUQ US demonstrated: Cholelithiasis. Non mobile gallstone at neck of gallbladder. No acute cholecystitis. See above for assessment and plan.     Review of Systems  See above, otherwise negative    Historical Information   Past Medical History:   Diagnosis Date    Disease of thyroid gland     Migraine      History reviewed. No pertinent surgical history.  Social History   Social History     Substance and Sexual Activity   Alcohol Use Never     Social History     Substance and Sexual Activity   Drug Use Never     Social History     Tobacco Use   Smoking Status Never   Smokeless Tobacco Never     Family History: non-contributory    Meds/Allergies   all medications and allergies reviewed  No Known Allergies    Objective   First Vitals:   Blood Pressure: (!) 184/92 (12/16/23 0410)  Pulse: 61 (12/16/23 0410)  Temperature: 97.9 °F (36.6 °C) (12/16/23 0410)  Temp Source: Oral (12/16/23 0410)  Respirations: 20 (12/16/23 0410)  SpO2: 100 % (12/16/23 0410)    Current Vitals:   Blood Pressure: 137/77 (12/16/23 0615)  Pulse: 62 (12/16/23 0615)  Temperature: 97.9 °F (36.6 °C) (12/16/23 0410)  Temp Source: Oral (12/16/23 0410)  Respirations: 16 (12/16/23 0615)  SpO2: 97 % (12/16/23 0615)    No intake or output data in the 24 hours ending 12/16/23 0953    Invasive Devices       Peripheral Intravenous Line  Duration             Peripheral IV 12/16/23 Proximal;Right;Ventral (anterior) Forearm <1 day                    Physical Exam:  General: No acute distress  Neuro: Alert, oriented to person and place  Eyes: Extraocular movements intact  CV: Well perfused, regular rate and rhythm  Lungs: Normal work of breathing, no increased respiratory effort  Abdomen: Soft, mildly tender upper epigastrium, non-distended.  Extremities: No edema, clubbing or cyanosis  Skin: Warm, dry  Lymph: No palpable lymph nodes  Psych: Normal mentation      Lab Results: I have personally reviewed pertinent lab results.    Imaging: I have  personally reviewed pertinent reports.    EKG, Pathology, and Other Studies: I have personally reviewed pertinent reports.      Code Status: Level 1 - Full Code  Advance Directive and Living Will:      Power of :    POLST:      Simon Hernandez MD  General Surgery Resident

## 2023-12-16 NOTE — ANESTHESIA PREPROCEDURE EVALUATION
Procedure:  CHOLECYSTECTOMY LAPAROSCOPIC (Abdomen)    Relevant Problems   CARDIO   (+) LEW (dyspnea on exertion)   (+) Hypertension   (+) Migraine without aura and without status migrainosus, not intractable      ENDO   (+) Hyperthyroidism      GI/HEPATIC   (+) Gastroesophageal reflux disease without esophagitis      NEURO/PSYCH   (+) Anxiety   (+) Migraine without aura and without status migrainosus, not intractable   (+) New daily persistent headache      PULMONARY   (+) LEW (dyspnea on exertion)   (+) RAJESH (obstructive sleep apnea)      Cardiovascular and Mediastinum   (+) Acquired pericardial cyst      Other   (+) Abnormal thyroid function test   (+) Thyroid antibody positive       12/16/23 05:33   PREGNANCY TEST URINE Negative     Normal sinus rhythm  Nonspecific ST abnormality  Abnormal ECG  When compared with ECG of 08-DEC-2021 14:16,  Nonspecific T wave abnormality, worse in Inferior leads  Confirmed by Jeffrey Zepeda ([id number]) on 9/21/2022 8:51:23 AM    Physical Exam    Airway    Mallampati score: I  TM Distance: >3 FB  Neck ROM: full     Dental       Cardiovascular      Pulmonary      Other Findings  post-pubertal.      Anesthesia Plan  ASA Score- 2     Anesthesia Type- general with ASA Monitors. Additional Monitors:     Airway Plan: ETT. Plan Factors-Exercise tolerance (METS): >4 METS. Chart reviewed. EKG reviewed. Imaging results reviewed. Existing labs reviewed. Patient summary reviewed. Patient is not a current smoker. Patient did not smoke on day of surgery. Induction- intravenous. Postoperative Plan- Plan for postoperative opioid use. Planned trial extubation    Informed Consent- Anesthetic plan and risks discussed with patient. I personally reviewed this patient with the CRNA. Discussed and agreed on the Anesthesia Plan with the CRNA. Yg Farmer

## 2023-12-16 NOTE — DISCHARGE INSTR - AVS FIRST PAGE
Please call the office when you leave to schedule an appointment for 2 weeks.              Please call [phone]. [address] [facility], [address], [place], [address]. Off of [address] between [address] and [employer].       Please note that the doctor's office is not located on the [facility].        Activity:    May lift 10 lb as many times as desired the 1st week,       20 lb in 2 weeks,       30 lb in 3 weeks.                Walking is encouraged  Normal daily activities including climbing steps are okay  Do not engage in strenuous activity ( sit-ups or crutches) or contact sports for 4-6 weeks post-operatively    Return to Work:   Okay to return to work when you feel well if you desire.        Diet:   You may return to your normal healthy diet.    Wound Care:  Your wound is closed with dissolvable stitches and glue.  It is okay to shower. Wash incision gently with soap and water and pat dry. Do not soak incisions in bath water or swim for two weeks. Do not apply any creams or ointments.    Pain Medication:   Please take as directed if needed. May use Advil or Motrin in addition.  Recall, the pain medicine and anesthesia is associated with constipation.    No driving while taking narcotic pain medications.      Other:  It is normal to developed a “healing ridge” / firm incision after surgery.  This is your body making scar tissue.  It is a good sign  Constipation is very common after general anesthesia.  Please use milk of magnesia as needed in order to help prevent constipation.  It is normal to get bruising after surgery.  If you have questions after discharge please call the office.    If you have increased pain, fever >101.5, increased drainage, redness or a bad smell at your surgery site, please call us immediately or come directly to the Emergency Room

## 2023-12-16 NOTE — CONSULTS
[facility]  Consult  Name: Shavonne Campos 48 y.o. female I MRN: [id number]  Unit/Bed#: ED-03 I Date of Admission: 12/16/2023   Date of Service: 12/16/2023 I Hospital Day: 0    Inpatient consult to Internal Medicine  Consult performed by: Jorge L Barnes DO  Consult ordered by: Simon Hernandez MD          Assessment/Plan   * Pre-operative clearance  Assessment & Plan  Pt gets SOB for 1 year climbing a flight of stairs, so 4 METs.  Sounds like surgery should be performed in next 6 weeks, and not a major surgery so moderate but acceptable risk.  Benefit of cholecystectomy outweighs risk of waiting  Note pt uses auto-CPAP qhs    Hypertension  Assessment & Plan  Manual BP acceptable at 160/80  Jodie-op, labetalol if SBP > 180    GILBERT (dyspnea on exertion)  Assessment & Plan  SOB after climbing flight of stairs  Would not delay surgery for this.  This can be worked up OP    RAJESH (obstructive sleep apnea)  Assessment & Plan  May need to extubate to CPAP    Migraine without aura and without status migrainosus, not intractable  Assessment & Plan  Chronic dizziness and headaches  Gilbert not tolerate Effexor or other migraine meds  Tylenol prn    Acquired pericardial cyst  Assessment & Plan  Benign  No further cardiac w/u needed    Gastroesophageal reflux disease without esophagitis  Assessment & Plan  PPI                     Recommendations for Discharge:  Needs OP f/u w/ PCP for GILBERT        Collaboration of Care: Were Recommendations Directly Discussed with Primary Treatment Team? Yes    History of Present Illness:  Shavonne Campos is a 48 y.o. female who is originally admitted to the surgery service due to symptomatic cholelithiasis. We are consulted for pre-op clearance.  Patient denies chest pain.  She for the past year, after climbing 14 steps she does admit to shortness of breath.  It resolves with rest.  Patient admits to chronic intermittent dizziness.  She saw neurology and diagnosed with migraines.   She was trialed on medication but did not tolerate this medication well.    Review of Systems:  Review of Systems   Constitutional:  Positive for activity change.   HENT: Negative.     Eyes: Negative.    Respiratory:  Positive for shortness of breath.    Cardiovascular: Negative.    Gastrointestinal:  Positive for abdominal pain.   Endocrine: Negative.    Genitourinary: Negative.    Musculoskeletal: Negative.    Skin: Negative.    Allergic/Immunologic: Negative.    Neurological:  Positive for dizziness.   Hematological: Negative.    Psychiatric/Behavioral: Negative.         Past Medical and Surgical History:   Past Medical History:   Diagnosis Date    Disease of thyroid gland     Migraine        History reviewed. No pertinent surgical history.    Meds/Allergies:  all medications and allergies reviewed    Allergies: No Known Allergies    Social History:  Marital Status: /[employer] Union  Substance Use History:   Social History     Substance and Sexual Activity   Alcohol Use Never     Social History     Tobacco Use   Smoking Status Never   Smokeless Tobacco Never     Social History     Substance and Sexual Activity   Drug Use Never       Family History:  Family History   Problem Relation Age of Onset    Diabetes type II Mother     Diabetes type II Father     Heart disease Father     Thyroid disease Sister     Colon cancer Maternal Grandmother     Other Daughter        Physical Exam:   Vitals:   Blood Pressure: 160/80 (12/16/23 1122)  Pulse: 60 (12/16/23 1102)  Temperature: 97.9 °F (36.6 °C) (12/16/23 0410)  Temp Source: Oral (12/16/23 0410)  Respirations: 16 (12/16/23 1102)  SpO2: 100 % (12/16/23 1102)    Physical Exam  HENT:      Head: Normocephalic and atraumatic.      Nose: Nose normal.      Mouth/Throat:      Mouth: Mucous membranes are moist.   Eyes:      Extraocular Movements: Extraocular movements intact.      Conjunctiva/sclera: Conjunctivae normal.   Cardiovascular:      Rate and Rhythm: Normal rate and  "regular rhythm.   Pulmonary:      Effort: Pulmonary effort is normal.      Breath sounds: Normal breath sounds.   Abdominal:      General: Bowel sounds are normal.      Palpations: Abdomen is soft.   Musculoskeletal:         General: Normal range of motion.      Cervical back: Normal range of motion and neck supple.      Right lower leg: No edema.      Left lower leg: No edema.   Skin:     General: Skin is warm and dry.   Neurological:      Mental Status: She is alert and oriented to person, place, and time.          Additional Data:   Lab Results:    Results from last 7 days   Lab Units 12/16/23  0435   WBC Thousand/uL 6.78   HEMOGLOBIN g/dL 9.5*   HEMATOCRIT % 33.2*   PLATELETS Thousands/uL 274   NEUTROS PCT % 78*   LYMPHS PCT % 17   MONOS PCT % 4   EOS PCT % 1     Results from last 7 days   Lab Units 12/16/23  0435   SODIUM mmol/L 138   POTASSIUM mmol/L 4.0   CHLORIDE mmol/L 103   CO2 mmol/L 27   BUN mg/dL 25   CREATININE mg/dL 0.76   ANION GAP mmol/L 8   CALCIUM mg/dL 9.1   ALBUMIN g/dL 4.3   TOTAL BILIRUBIN mg/dL 0.68   ALK PHOS U/L 113*   ALT U/L 110*   AST U/L 179*   GLUCOSE RANDOM mg/dL 118             No results found for: \"HGBA1C\"      Results from last 7 days   Lab Units 12/16/23  0435   LACTIC ACID mmol/L 1.5       Imaging: Reviewed radiology reports from this admission including: ultrasound(s) and Personally reviewed the following imaging: chest xray  US right upper quadrant   Final Result by Arthur Garcia MD (12/16 0914)      Cholelithiasis including small nonmobile gallstone at the gallbladder neck. No evidence of acute cholecystitis.      Pertinent findings on this study conveyed by myself to Simon Hernandez on 12/16/2023 at 09:12 via [employer] with prompt response.      Workstation performed: [id number]         CT abdomen pelvis with contrast   Final Result by Arthur Garcia MD (12/16 0628)      No evidence of acute abdominopelvic process.      Cholelithiasis.    "   Additional chronic findings and negatives as above.                  Workstation performed: [id number]         XR chest 2 views   ED Interpretation by TWILA Ford (12/16 0951)   No acute cardiopulmonary disease identified by me.          EKG, Pathology, and Other Studies Reviewed on Admission:   EKG:  NSR.    ** Please Note: This note may have been constructed using a voice recognition system. **

## 2023-12-17 LAB
ATRIAL RATE: 63 BPM
P AXIS: 14 DEGREES
PR INTERVAL: 122 MS
QRS AXIS: 47 DEGREES
QRSD INTERVAL: 82 MS
QT INTERVAL: 432 MS
QTC INTERVAL: 442 MS
T WAVE AXIS: 57 DEGREES
VENTRICULAR RATE: 63 BPM

## 2023-12-21 NOTE — UTILIZATION REVIEW
EMERGENT OUTPATIENT PROCEDURE AUTHORIZATION REQUEST   REQUESTING/SERVICING FACILITY:   [facility]  Address: [address] [place] [address]  Tax ID: [id number]  NPI: [id number]   Place of Service: [facility]  Place of Service Code: 22 ATTENDING PROVIDER:  Attending Name and NPI#: Sang Goodwin Md [[id number]]  Address: [address] [place] [address]  Phone: [phone]     STAY INFORMATION:  Patient presented to the ER, had an outpatient procedure and discharge.   No admission orders.     OUTPATIENT PROCEDURE INFORMATION    Procedure Date: 12/16/2023  Discharge Date/Time: 12/16/2023  3:31 PM  Patient Preop Diagnosis: Epigastric abdominal pain [R10.13] Post-Op Diagnosis Codes:     * Epigastric abdominal pain [R10.13]  Outpatient Procedure CPT Codes:  50649 Laparoscopic cholecystectomy    **OPERATIVE REPORT ATTACHED**     UTILIZATION REVIEW CONTACT:  Feliciano Lara, Utilization   Network Utilization Review Department  Phone: [phone]  Fax: [phone]  Email: [email or web address]  Contact for approvals/pending authorizations, clinical reviews, and discharge.        
None known

## 2024-01-02 ENCOUNTER — OFFICE VISIT (OUTPATIENT)
Dept: SURGERY | Facility: CLINIC | Age: 49
End: 2024-01-02

## 2024-01-02 DIAGNOSIS — K21.9 GASTROESOPHAGEAL REFLUX DISEASE WITHOUT ESOPHAGITIS: Primary | ICD-10-CM

## 2024-01-02 DIAGNOSIS — Z48.89 POSTOPERATIVE VISIT: ICD-10-CM

## 2024-01-02 PROCEDURE — 99024 POSTOP FOLLOW-UP VISIT: CPT | Performed by: SURGERY

## 2024-01-02 NOTE — PROGRESS NOTES
Assessment/Plan: Patient is status post laparoscopic cholecystectomy.  Overall she is feeling well.  She does have some substernal mild discomfort on occasion.  It is intermittent.  She maintains her PPI.  She does have a history for reflux disease.  Does not seem to be related to her biliary tract.  I asked her to notify her gastroenterologist if this persists an additional 4 weeks.  She is in agreement.    Her abdomen is soft and nontender.  Incisions are clean and healing nicely.  All questions answered.    There are no diagnoses linked to this encounter.    Pathology: Reviewed with patient, all questions answered.       Postoperative restrictions reviewed. All questions answered.       ______________________________________________________  HPI: Patient presents post operatively.  Laparoscopic cholecystectomy 12/16/2023   Final Diagnosis  A. Gallbladder, cholecystectomy:  -Gallbladder with cholelithiasis.                         ROS:  General ROS: negative for - chills, fatigue, fever or night sweats, weight loss  Respiratory ROS: no cough, shortness of breath, or wheezing  Cardiovascular ROS: no chest pain or dyspnea on exertion  Genito-Urinary ROS: no dysuria, trouble voiding, or hematuria  Musculoskeletal ROS: negative for - gait disturbance, joint pain or muscle pain  Neurological ROS: no TIA or stroke symptoms  GI ROS: see HPI  Skin ROS: no new rashes or lesions   Lymphatic ROS: no new adenopathy noted by pt.   GYN ROS: see HPI, no new GYN history or bleeding noted  Psy ROS: no new mental or behavioral disturbances         Patient Active Problem List   Diagnosis    Hyperthyroidism    Vitamin D deficiency    Gastroesophageal reflux disease without esophagitis    New daily persistent headache    Acquired pericardial cyst    Psychogenic vomiting    Anxiety    Migraine without aura and without status migrainosus, not intractable    Abnormal thyroid function test    Thyroid antibody positive    RAJESH (obstructive  sleep apnea)    Pre-operative clearance    LEW (dyspnea on exertion)    Hypertension       Allergies:  Patient has no known allergies.      Current Outpatient Medications:     Cholecalciferol (Vitamin D3) 50 MCG (2000 UT) capsule, Take 1.5 capsules (3,000 Units total) by mouth daily, Disp: , Rfl:     omeprazole (PriLOSEC) 40 MG capsule, TAKE 1 CAPSULE TWICE A DAY, Disp: 180 capsule, Rfl: 3    oxyCODONE-acetaminophen (PERCOCET) 5-325 mg per tablet, Take 1 tablet by mouth every 4 (four) hours as needed for moderate pain for up to 10 doses Max Daily Amount: 6 tablets, Disp: 10 tablet, Rfl: 0    rizatriptan (Maxalt) 10 mg tablet, Take 1 tablet (10 mg total) by mouth as needed for migraine Take at the onset of migraine; if symptoms continue or return, may take another dose at least 2 hours after first dose. Take no more than 2 doses in a day., Disp: 10 tablet, Rfl: 3    venlafaxine (EFFEXOR-XR) 37.5 mg 24 hr capsule, Take 1 capsule (37.5 mg total) by mouth daily for 7 days Then increase to 75 mg daily, Disp: 7 capsule, Rfl: 0    venlafaxine (EFFEXOR-XR) 75 mg 24 hr capsule, TAKE 1 CAPSULE BY MOUTH EVERY DAY, Disp: 90 capsule, Rfl: 2    Past Medical History:   Diagnosis Date    Disease of thyroid gland     Migraine        Past Surgical History:   Procedure Laterality Date    CHOLECYSTECTOMY LAPAROSCOPIC N/A 12/16/2023    Procedure: CHOLECYSTECTOMY LAPAROSCOPIC;  Surgeon: Sang Goodwin MD;  Location: [facility];  Service: General       Family History   Problem Relation Age of Onset    Diabetes type II Mother     Diabetes type II Father     Heart disease Father     Thyroid disease Sister     Colon cancer Maternal Grandmother     Other Daughter         reports that she has never smoked. She has never used smokeless tobacco. She reports that she does not drink alcohol and does not use drugs.    PHYSICAL EXAM    LMP 12/14/2023 (Exact Date)     General: normal, cooperative, no distress  Abdominal: soft, nondistended, or  nontender  Incision: clean, dry, and intact and healing well      Sang Goodwin MD    Date: 1/2/2024 Time: 10:51 AM

## 2024-01-22 ENCOUNTER — OFFICE VISIT (OUTPATIENT)
Dept: NEUROLOGY | Facility: CLINIC | Age: 49
End: 2024-01-22
Payer: COMMERCIAL

## 2024-01-22 VITALS
DIASTOLIC BLOOD PRESSURE: 88 MMHG | HEART RATE: 72 BPM | OXYGEN SATURATION: 100 % | WEIGHT: 164.9 LBS | BODY MASS INDEX: 32.38 KG/M2 | HEIGHT: 60 IN | SYSTOLIC BLOOD PRESSURE: 138 MMHG | TEMPERATURE: 97.6 F

## 2024-01-22 DIAGNOSIS — G47.33 OBSTRUCTIVE SLEEP APNEA (ADULT) (PEDIATRIC): ICD-10-CM

## 2024-01-22 DIAGNOSIS — G43.009 MIGRAINE WITHOUT AURA AND WITHOUT STATUS MIGRAINOSUS, NOT INTRACTABLE: Primary | ICD-10-CM

## 2024-01-22 DIAGNOSIS — F41.9 ANXIETY AND DEPRESSION: ICD-10-CM

## 2024-01-22 DIAGNOSIS — F32.A ANXIETY AND DEPRESSION: ICD-10-CM

## 2024-01-22 DIAGNOSIS — E66.9 OBESITY (BMI 30-39.9): ICD-10-CM

## 2024-01-22 PROCEDURE — 99214 OFFICE O/P EST MOD 30 MIN: CPT | Performed by: STUDENT IN AN ORGANIZED HEALTH CARE EDUCATION/TRAINING PROGRAM

## 2024-01-22 NOTE — PROGRESS NOTES
Since your last visit are your headaches Improved    Any change to the headache type? No, CPAP has helped patient states     What is your current headache frequency: 1 times per week    Are you taking your current medications as prescribed? yes    If no, why not?     Do you have any side effects? no    How may days per week do you take an abortive medicine? 0/7

## 2024-01-22 NOTE — PATIENT INSTRUCTIONS
Headache Calendar  Please maintain a headache calendar  Consider using phone applications such as Migraine Ming or [employer] Migraine Tracker     Headache/migraine treatment:   Acute medications (for immediate treatment of a headache):   It is ok to take ibuprofen, acetaminophen or naproxen (Advil, Tylenol,  Aleve, Excedrin) if they help your headaches you should limit these to No more than 2-3 times a week to avoid medication overuse/rebound headaches.      Over the counter preventive supplements for headaches/migraines (if you try, try for 3 months straight)  (to take every day to help prevent headaches - not to take at the time of headache):  [x] Magnesium 400mg daily (If any diarrhea or upset stomach, decrease dose  as tolerated) - oxide or glycinate  [x] Riboflavin (Vitamin B2) 400mg daily - (FYI B2 may make your urine bright/neon yellow)     Lifestyle Recommendations:  [x] SLEEP - Maintain a regular sleep schedule: Adults need at least 7-8 hours of uninterrupted a night. Maintain good sleep hygiene:  Going to bed and waking up at consistent times, avoiding excessive daytime naps, avoiding caffeinated beverages in the evening, avoid excessive stimulation in the evening and generally using bed primarily for sleeping.  One hour before bedtime would recommend turning lights down lower, decreasing your activity (may read quietly, listen to music at a low volume). When you get into bed, should eliminate all technology (no texting, emailing, playing with your phone, iPad or tablet in bed).  [x] HYDRATION - Maintain good hydration.  Drink  2L of fluid a day (4 typical small water bottles)  [x] DIET - Maintain good nutrition. In particular don't skip meals and try and eat healthy balanced meals regularly.  [x] TRIGGERS - Look for other triggers and avoid them: Limit caffeine to 1-2 cups a day or less. Avoid dietary triggers that you have noticed bring on your headaches (this could include aged cheese, peanuts, MSG,  aspartame and nitrates).  [x] EXERCISE - physical exercise as we all know is good for you in many ways, and not only is good for your heart, but also is beneficial for your mental health, cognitive health and  chronic pain/headaches. I would encourage at the least 5 days of physical exercise weekly for at least 30 minutes.      Education and Follow-up  [x] Please call with any questions or concerns. Of course if any new concerning symptoms go to the emergency department.  [x] Follow up in 6 months

## 2024-01-22 NOTE — PROGRESS NOTES
[facility] Consult - Follow up   PATIENT:  Shavonne Campos  MRN:  [id number]  :  1975  DATE OF SERVICE:  2024  REFERRED BY: No ref. provider found  PMD: Jacob Butt DO    Assessment/Plan:   Shavonne Campos is a delightful 47 y.o. female with a past medical history that includes GERD, anxiety, hyperthyroidism here for f/u evaluation of headache.     At today's visit, she reports that she is doing fairly well.  She currently endorses about 1-2 headache days per week and has found improvement with her CPAP.  She stopped taking venlafaxine due to side effects.  She similarly also had side effects with rizatriptan.  We discussed a variety of potential options at today's visit, but she preferred to take the more natural route and try magnesium and B2 supplements before adding on any more prescription medication.  If she continues to struggle at her follow-up visit, it may be worth exploring other preventive options such as the CGRP injectables given the fact that they are so well-tolerated.    Workup:  - Neurologic assessment reveals unremarkable neurological exam.  - MRI brain with and without contrast 2023: No acute findings.  No white matter changes.  No postcontrast enhancement. (I have personally reviewed imaging and radiology read)  - Home sleep study 6/15/2023: Mild obstructive sleep apnea     Preventative:  - we discussed headache hygiene and lifestyle factors that may improve headaches  - Mg and B2  - Currently on through other providers: None  - Past/ failed/contraindicated: Lexapro (mood - did not tolerate), Amitriptyline (side effects), Topamax (side effects), Venlafaxine (side effects)  - future options: CGRP med, botox     Acute:  - discussed not taking over-the-counter or prescription pain medications more than 3 days per week to prevent medication overuse/rebound headache  - Currently on through other providers: None  - Past/ failed/contraindicated:  Rizatriptan (side effects)  - future options:  Triptan, prochlorperazine, Toradol IM or p.o., could consider trial of 5 days of Depakote 500 mg nightly or dexamethasone 2 mg daily for prolonged migraine, ubrelvy, reyvow, nurtec  Patient instructions   Headache Calendar  Please maintain a headache calendar  Consider using phone applications such as Migraine Ming or [place] Migraine Tracker     Headache/migraine treatment:   Acute medications (for immediate treatment of a headache):   It is ok to take ibuprofen, acetaminophen or naproxen (Advil, Tylenol,  Aleve, Excedrin) if they help your headaches you should limit these to No more than 2-3 times a week to avoid medication overuse/rebound headaches.      Over the counter preventive supplements for headaches/migraines (if you try, try for 3 months straight)  (to take every day to help prevent headaches - not to take at the time of headache):  [x] Magnesium 400mg daily (If any diarrhea or upset stomach, decrease dose  as tolerated) - oxide or glycinate  [x] Riboflavin (Vitamin B2) 400mg daily - (FYI B2 may make your urine bright/neon yellow)     Lifestyle Recommendations:  [x] SLEEP - Maintain a regular sleep schedule: Adults need at least 7-8 hours of uninterrupted a night. Maintain good sleep hygiene:  Going to bed and waking up at consistent times, avoiding excessive daytime naps, avoiding caffeinated beverages in the evening, avoid excessive stimulation in the evening and generally using bed primarily for sleeping.  One hour before bedtime would recommend turning lights down lower, decreasing your activity (may read quietly, listen to music at a low volume). When you get into bed, should eliminate all technology (no texting, emailing, playing with your phone, iPad or tablet in bed).  [x] HYDRATION - Maintain good hydration.  Drink  2L of fluid a day (4 typical small water bottles)  [x] DIET - Maintain good nutrition. In particular don't skip meals and try and eat  healthy balanced meals regularly.  [x] TRIGGERS - Look for other triggers and avoid them: Limit caffeine to 1-2 cups a day or less. Avoid dietary triggers that you have noticed bring on your headaches (this could include aged cheese, peanuts, MSG, aspartame and nitrates).  [x] EXERCISE - physical exercise as we all know is good for you in many ways, and not only is good for your heart, but also is beneficial for your mental health, cognitive health and  chronic pain/headaches. I would encourage at the least 5 days of physical exercise weekly for at least 30 minutes.      Education and Follow-up  [x] Please call with any questions or concerns. Of course if any new concerning symptoms go to the emergency department.  [x] Follow up in 6 months  Subjective:   1/22/24: Since her last visit, she reached out to let me know that she stopped taking Topamax because she felt that it started to give her neck pain to the point where she was unable to turn her head.  I recommended that she try venlafaxine instead.  She stopped this medication as well due to side effects, it was making her tired.  Currently has been using her CPAP device nightly (about 8 hours per night) and has noticed improvement to her headache frequency.  She has been utilizing tylenol or motrin about 1-2x per week as needed for headaches currently.      Previous History:  7/20/23: Since her last visit, she feels that her headaches have worsened.  She is experiencing them daily. She stopped taking amitriptyline because she did not like the side effects (weak). She is a little unclear with Rizatriptan, but appears that it may have been helpful. They do not report any voicemails left by sleep medicine for scheduling.  1/25/23: Ms. Campos presents with a history of headaches for the last few years.  Denies any history of headaches as a child or growing up.  The description of her headaches sound like migraines, which is the likely diagnosis especially since her  daughter reports that she also has a history of migraines.  She has never been formally evaluated for them or treated for them.  We discussed a variety of potential treatment options, but thought that amitriptyline would be a good choice given her comorbid anxiety/depression and difficulties with sleep.  From an abortive standpoint I will have her try rizatriptan.  I suspect she has multiple contributing components to her ongoing headaches, but I think sleep may be playing a significant role.  She only sleeps for about 2 to 3 hours per night and I am also concerned about sleep apnea.  I have recommended that she undergo a sleep study for further evaluation.  Due to a recent increase in frequency/intensity of her headaches I would also like to obtain an MRI of the brain.  I have asked her to make an appointment with her eye doctor for her yearly eye exam to ensure that there are no other issues causing her blurry vision.  I have also recommended that she avoid driving when she has headaches if she feels that she cannot focus or feels unsafe.  Past Medical History:     Past Medical History:   Diagnosis Date    Disease of thyroid gland     Migraine        Patient Active Problem List   Diagnosis    Hyperthyroidism    Vitamin D deficiency    Gastroesophageal reflux disease without esophagitis    New daily persistent headache    Acquired pericardial cyst    Psychogenic vomiting    Anxiety    Migraine without aura and without status migrainosus, not intractable    Abnormal thyroid function test    Thyroid antibody positive    RAJESH (obstructive sleep apnea)    Pre-operative clearance    LEW (dyspnea on exertion)    Hypertension    Postoperative visit       Medications:      Current Outpatient Medications   Medication Sig Dispense Refill    omeprazole (PriLOSEC) 40 MG capsule TAKE 1 CAPSULE TWICE A  capsule 3    Cholecalciferol (Vitamin D3) 50 MCG (2000 UT) capsule Take 1.5 capsules (3,000 Units total) by mouth daily  (Patient not taking: Reported on 1/22/2024)      oxyCODONE-acetaminophen (PERCOCET) 5-325 mg per tablet Take 1 tablet by mouth every 4 (four) hours as needed for moderate pain for up to 10 doses Max Daily Amount: 6 tablets (Patient not taking: Reported on 1/22/2024) 10 tablet 0    rizatriptan (Maxalt) 10 mg tablet Take 1 tablet (10 mg total) by mouth as needed for migraine Take at the onset of migraine; if symptoms continue or return, may take another dose at least 2 hours after first dose. Take no more than 2 doses in a day. (Patient not taking: Reported on 1/22/2024) 10 tablet 3    venlafaxine (EFFEXOR-XR) 37.5 mg 24 hr capsule Take 1 capsule (37.5 mg total) by mouth daily for 7 days Then increase to 75 mg daily (Patient not taking: Reported on 1/22/2024) 7 capsule 0    venlafaxine (EFFEXOR-XR) 75 mg 24 hr capsule TAKE 1 CAPSULE BY MOUTH EVERY DAY (Patient not taking: Reported on 1/22/2024) 90 capsule 2     No current facility-administered medications for this visit.        Allergies:    No Known Allergies    Family History:     Family History   Problem Relation Age of Onset    Diabetes type II Mother     Diabetes type II Father     Heart disease Father     Thyroid disease Sister     Colon cancer Maternal Grandmother     Other Daughter        Social History:     Social History     Socioeconomic History    Marital status: /Civil Union     Spouse name: Not on file    Number of children: 2    Years of education: Not on file    Highest education level: Not on file   Occupational History    Not on file   Tobacco Use    Smoking status: Never    Smokeless tobacco: Never   Vaping Use    Vaping status: Never Used   Substance and Sexual Activity    Alcohol use: Never    Drug use: Never    Sexual activity: Not Currently     Partners: Male   Other Topics Concern    Not on file   Social History Narrative    Not on file     Social Determinants of Health     Financial Resource Strain: Not on file   Food Insecurity: Not on  file   Transportation Needs: Not on file   Physical Activity: Not on file   Stress: Not on file   Social Connections: Not on file   Intimate Partner Violence: Not on file   Housing Stability: Not on file         Objective:   Physical Exam:                                                               Vitals:            Constitutional:  /88 (BP Location: Right arm, Patient Position: Sitting, Cuff Size: Adult)   Pulse 72   Temp 97.6 °F (36.4 °C) (Temporal)   Ht 5' (1.524 m)   Wt 74.8 kg (164 lb 14.4 oz)   SpO2 100%   BMI 32.20 kg/m²   BP Readings from Last 3 Encounters:   01/22/24 138/88   12/16/23 168/82   10/05/23 112/70     Pulse Readings from Last 3 Encounters:   01/22/24 72   12/16/23 62   10/05/23 74         Well developed, well nourished, well groomed. No dysmorphic features.       HEENT:  Normocephalic atraumatic. See neuro exam   Chest:  Respirations appear regular and unlabored.    Cardiovascular:  no observed significant swelling.    Musculoskeletal:  (see below under neurologic exam for evaluation of motor function and gait)   Skin:  warm and dry, not diaphoretic.    Psychiatric:  Normal behavior and appropriate affect       Neurological Examination:     Mental status/cognitive function:   Recent and remote memory intact. Attention span and concentration as well as fund of knowledge are appropriate for age. Normal language and spontaneous speech.  Cranial Nerves:  III, IV, VI-Pupils were equal, round. Extraocular movements were full and conjugate   VII-facial expression symmetric  VIII-hearing grossly intact bilaterally   Motor Exam: symmetric bulk throughout. no atrophy, fasciculations or abnormal movements noted.   Coordination:  no apparent dysmetria, ataxia or tremor noted  Gait: steady casual gait   Review of Systems:   Constitutional:  Negative for appetite change, fatigue and fever.   HENT: Negative.  Negative for hearing loss, tinnitus, trouble swallowing and voice change.    Eyes:  Negative.  Negative for photophobia, pain and visual disturbance.   Respiratory: Negative.  Negative for shortness of breath.    Cardiovascular: Negative.  Negative for palpitations.   Gastrointestinal: Negative.  Negative for nausea and vomiting.   Endocrine: Negative.  Negative for cold intolerance.   Genitourinary: Negative.  Negative for dysuria, frequency and urgency.   Musculoskeletal:  Negative for back pain, gait problem, myalgias, neck pain and neck stiffness.   Skin: Negative.  Negative for rash.   Allergic/Immunologic: Negative.    Neurological:  Positive for headaches. Negative for dizziness, tremors, seizures, syncope, facial asymmetry, speech difficulty, weakness, light-headedness and numbness.   Hematological: Negative.  Does not bruise/bleed easily.   Psychiatric/Behavioral: Negative.  Negative for confusion, hallucinations and sleep disturbance.    All other systems reviewed and are negative.    I have spent 15 minutes with Patient and family today in which greater than 50% of this time was spent in counseling/coordination of care regarding Diagnostic results, Prognosis, Risks and benefits of tx options, Patient and family education, Importance of tx compliance, Impressions, Documenting in the medical record, Reviewing / ordering tests, medicine, procedures  , and Obtaining or reviewing history  . I also spent 15 minutes non face to face for this patient the same day.     Activity Minutes   Precharting/reviewing 10   Patient care/counseling 15   Postcharting/care coordination 5       Author:  Terrence Howe DO 1/22/2024 3:01 PM

## 2024-11-11 ENCOUNTER — APPOINTMENT (EMERGENCY)
Dept: RADIOLOGY | Facility: HOSPITAL | Age: 49
End: 2024-11-11

## 2024-11-11 ENCOUNTER — TELEPHONE (OUTPATIENT)
Dept: CT IMAGING | Facility: HOSPITAL | Age: 49
End: 2024-11-11

## 2024-11-11 ENCOUNTER — HOSPITAL ENCOUNTER (EMERGENCY)
Facility: HOSPITAL | Age: 49
Discharge: HOME | End: 2024-11-11
Attending: EMERGENCY MEDICINE

## 2024-11-11 ENCOUNTER — APPOINTMENT (EMERGENCY)
Dept: CT IMAGING | Facility: HOSPITAL | Age: 49
End: 2024-11-11

## 2024-11-11 VITALS
HEART RATE: 66 BPM | OXYGEN SATURATION: 100 % | RESPIRATION RATE: 16 BRPM | DIASTOLIC BLOOD PRESSURE: 76 MMHG | TEMPERATURE: 98 F | SYSTOLIC BLOOD PRESSURE: 139 MMHG

## 2024-11-11 DIAGNOSIS — R07.9 CHEST PAIN: Primary | ICD-10-CM

## 2024-11-11 LAB
ALBUMIN SERPL BCG-MCNC: 4.1 G/DL (ref 3.5–5)
ALP SERPL-CCNC: 66 U/L (ref 34–104)
ALT SERPL W P-5'-P-CCNC: 13 U/L (ref 7–52)
ANION GAP SERPL CALCULATED.3IONS-SCNC: 6 MMOL/L (ref 4–13)
AST SERPL W P-5'-P-CCNC: 15 U/L (ref 13–39)
BASOPHILS # BLD AUTO: 0.03 THOUSANDS/ÂΜL (ref 0–0.1)
BASOPHILS NFR BLD AUTO: 1 % (ref 0–1)
BILIRUB SERPL-MCNC: 0.25 MG/DL (ref 0.2–1)
BUN SERPL-MCNC: 16 MG/DL (ref 5–25)
CALCIUM SERPL-MCNC: 9.1 MG/DL (ref 8.4–10.2)
CARDIAC TROPONIN I PNL SERPL HS: <2 NG/L
CHLORIDE SERPL-SCNC: 107 MMOL/L (ref 96–108)
CO2 SERPL-SCNC: 27 MMOL/L (ref 21–32)
CREAT SERPL-MCNC: 0.63 MG/DL (ref 0.6–1.3)
D DIMER PPP FEU-MCNC: 0.78 UG/ML FEU
EOSINOPHIL # BLD AUTO: 0.1 THOUSAND/ÂΜL (ref 0–0.61)
EOSINOPHIL NFR BLD AUTO: 2 % (ref 0–6)
ERYTHROCYTE [DISTWIDTH] IN BLOOD BY AUTOMATED COUNT: 17.2 % (ref 11.6–15.1)
GFR SERPL CREATININE-BSD FRML MDRD: 104 ML/MIN/1.73SQ M
GLUCOSE SERPL-MCNC: 93 MG/DL (ref 65–140)
HCT VFR BLD AUTO: 31.3 % (ref 34.8–46.1)
HGB BLD-MCNC: 9.6 G/DL (ref 11.5–15.4)
IMM GRANULOCYTES # BLD AUTO: 0.02 THOUSAND/UL (ref 0–0.2)
IMM GRANULOCYTES NFR BLD AUTO: 0 % (ref 0–2)
LIPASE SERPL-CCNC: 25 U/L (ref 11–82)
LYMPHOCYTES # BLD AUTO: 2.09 THOUSANDS/ÂΜL (ref 0.6–4.47)
LYMPHOCYTES NFR BLD AUTO: 35 % (ref 14–44)
MCH RBC QN AUTO: 23.7 PG (ref 26.8–34.3)
MCHC RBC AUTO-ENTMCNC: 30.7 G/DL (ref 31.4–37.4)
MCV RBC AUTO: 77 FL (ref 82–98)
MONOCYTES # BLD AUTO: 0.36 THOUSAND/ÂΜL (ref 0.17–1.22)
MONOCYTES NFR BLD AUTO: 6 % (ref 4–12)
NEUTROPHILS # BLD AUTO: 3.39 THOUSANDS/ÂΜL (ref 1.85–7.62)
NEUTS SEG NFR BLD AUTO: 56 % (ref 43–75)
NRBC BLD AUTO-RTO: 0 /100 WBCS
PLATELET # BLD AUTO: 251 THOUSANDS/UL (ref 149–390)
PMV BLD AUTO: 9 FL (ref 8.9–12.7)
POTASSIUM SERPL-SCNC: 3.6 MMOL/L (ref 3.5–5.3)
PROT SERPL-MCNC: 7 G/DL (ref 6.4–8.4)
RBC # BLD AUTO: 4.05 MILLION/UL (ref 3.81–5.12)
SODIUM SERPL-SCNC: 140 MMOL/L (ref 135–147)
WBC # BLD AUTO: 5.99 THOUSAND/UL (ref 4.31–10.16)

## 2024-11-11 PROCEDURE — 71275 CT ANGIOGRAPHY CHEST: CPT

## 2024-11-11 PROCEDURE — 85025 COMPLETE CBC W/AUTO DIFF WBC: CPT | Performed by: EMERGENCY MEDICINE

## 2024-11-11 PROCEDURE — 36415 COLL VENOUS BLD VENIPUNCTURE: CPT

## 2024-11-11 PROCEDURE — 99284 EMERGENCY DEPT VISIT MOD MDM: CPT

## 2024-11-11 PROCEDURE — 85379 FIBRIN DEGRADATION QUANT: CPT

## 2024-11-11 PROCEDURE — 83690 ASSAY OF LIPASE: CPT | Performed by: EMERGENCY MEDICINE

## 2024-11-11 PROCEDURE — 84484 ASSAY OF TROPONIN QUANT: CPT | Performed by: EMERGENCY MEDICINE

## 2024-11-11 PROCEDURE — 93005 ELECTROCARDIOGRAM TRACING: CPT

## 2024-11-11 PROCEDURE — 80053 COMPREHEN METABOLIC PANEL: CPT | Performed by: EMERGENCY MEDICINE

## 2024-11-11 PROCEDURE — 71045 X-RAY EXAM CHEST 1 VIEW: CPT

## 2024-11-11 PROCEDURE — 99285 EMERGENCY DEPT VISIT HI MDM: CPT | Performed by: EMERGENCY MEDICINE

## 2024-11-11 RX ADMIN — IOHEXOL 50 ML: 350 INJECTION, SOLUTION INTRAVENOUS at 18:03

## 2024-11-11 NOTE — ED ATTENDING ATTESTATION
"11/11/2024  I, Ti Daly DO, saw and evaluated the patient. I have discussed the patient with the resident/non-physician practitioner and agree with the resident's/non-physician practitioner's findings, Plan of Care, and MDM as documented in the resident's/non-physician practitioner's note, except where noted. All available labs and Radiology studies were reviewed.  I was present for key portions of any procedure(s) performed by the resident/non-physician practitioner and I was immediately available to provide assistance.       At this point I agree with the current assessment done in the Emergency Department.  I have conducted an independent evaluation of this patient a history and physical is as follows:    Patient is a 49-year-old female who presents with chest pain.  Patient and daughter, who is translating, states that the pain has been present for about 2 years.  It was previously intermittent but has been more constant over the past week.  She describes it as a sharp pain in her left chest, under her left breast.  It is nonradiating.  It seems to worsen with deep inspiration and certain movements.  She denies any alleviating factors.  She denies associated shortness of breath, fever, chills, cough, lower extremity pain.  She does admit to lower extremity edema, which is baseline and present for years.  She made an appointment to see a \"specialist\" in [place] but her daughter convinced her to come to the ED today due to worsening pain over the past week.    On exam, patient is in no acute distress.  Heart is regular rate and rhythm.  Breath sounds normal.  Chest wall is nontender to palpation.  Skin normal.  Abdomen is soft, nontender, nondistended.  No rebound or guarding.  No pitting edema in the lower extremities.  No calf tenderness.    Do not suspect ACS, aortic dissection, pneumothorax, pericardial tamponade, pulmonary embolism, esophageal rupture as cause of chest pain. HEART score completed. " "Shared decision making used and patient prefers discharge and outpatient follow up. Patient will follow up with PCP to facilitate further workup. Advised to return to ED immediately if symptoms return.      Portions of the above record have been created with voice recognition software.  Occasional wrong word or \"sound alike\" substitutions may have occurred due to the inherent limitations of voice recognition software.  Read the chart carefully and recognize, using context, where substitutions may have occurred.      ED Course         Critical Care Time  Procedures      "

## 2024-11-12 LAB
ATRIAL RATE: 62 BPM
P AXIS: 39 DEGREES
PR INTERVAL: 132 MS
QRS AXIS: 13 DEGREES
QRSD INTERVAL: 78 MS
QT INTERVAL: 406 MS
QTC INTERVAL: 412 MS
T WAVE AXIS: 32 DEGREES
VENTRICULAR RATE: 62 BPM

## 2024-11-12 PROCEDURE — 93010 ELECTROCARDIOGRAM REPORT: CPT | Performed by: INTERNAL MEDICINE

## 2024-11-12 NOTE — DISCHARGE INSTRUCTIONS
Take Tylenol/ibuprofen as needed for pain.    Follow-up with PCP for further evaluation of incidental pericardial cyst and small hernia as discussed.  Return to ED if worsening symptoms.

## 2024-11-12 NOTE — ED PROVIDER NOTES
Time reflects when diagnosis was documented in both MDM as applicable and the Disposition within this note       Time User Action Codes Description Comment    11/11/2024  7:55 PM StevenKristian Add [R07.9] Chest pain           ED Disposition       ED Disposition   Discharge    Condition   Stable    Date/Time   Mon Nov 11, 2024  7:54 PM    Comment   Shavonne Campos discharge to home/self care.                   Assessment & Plan       Medical Decision Making  See ED course    Amount and/or Complexity of Data Reviewed  Labs: ordered. Decision-making details documented in ED Course.  Radiology: ordered.    Risk  Prescription drug management.        ED Course as of 11/12/24 0113   Mon Nov 11, 2024   1613 49F otherwise healthy presenting for left sided chest pain. Had similar chest pain for the past year that was mostly intermittent but has become more constant in the past week. Pain is generally parasternal/to the lower chest. Contrary to triage note, denies radiation to jaw, arm, neck, back. No recent illness, no travel, no OCPs, no blood thinners, or hx of clotting issues. Was evaluated in the ED 1 year ago with similar presentation with incidental pericardial cyst for which she has not followed up on. No fevers, ha, abd pain, change in bladder/bowel function, blood in urine, or other acute symptoms.   1650 Dx includes costochondritis, pleurisy, URI, atypical pneumonia, other MSK pain. Lower suspicion for ACS, PE given benign presentation.    1652 Cbc, cmp grossly baseline   1652 hs TnI 0hr: <2   1652 LIPASE: 25   1652 EKG without acute findings. CXR with no acute cardiopulmonary findings.    1655 D-Dimer, Quant(!): 0.78  CTPE pending   1920 CT PE:  IMPRESSION:  No pulmonary embolism. Clear lungs.  Stable pericardial cyst.  Small hiatal hernia.   e Nov 12, 2024   0112 Discharged in stable condition.  Outpatient follow-up       Medications   iohexol (OMNIPAQUE) 350 MG/ML injection (MULTI-DOSE) 50 mL (50 mL Intravenous  "Given 11/11/24 1803)       ED Risk Strat Scores   HEART Risk Score      Flowsheet Row Most Recent Value   Heart Score Risk Calculator    History 0 Filed at: 11/11/2024 1653   ECG 0 Filed at: 11/11/2024 1653   Age 1 Filed at: 11/11/2024 1653   Risk Factors 0 Filed at: 11/11/2024 1653   Troponin 0 Filed at: 11/11/2024 1653   HEART Score 1 Filed at: 11/11/2024 1653                           PERC Rule for PE      Flowsheet Row Most Recent Value   PERC Rule for PE    Age >=50 1 Filed at: 11/11/2024 1653   HR >=100 0 Filed at: 11/11/2024 1653   O2 Sat on room air < 95% 0 Filed at: 11/11/2024 1653   History of PE or DVT 0 Filed at: 11/11/2024 1653   Recent trauma or surgery 0 Filed at: 11/11/2024 1653   Hemoptysis 0 Filed at: 11/11/2024 1653   Exogenous estrogen 0 Filed at: 11/11/2024 1653   Unilateral leg swelling 0 Filed at: 11/11/2024 1653   PERC Rule for PE Results 1 Filed at: 11/11/2024 1653                Wells' Criteria for PE      Flowsheet Row Most Recent Value   Wells' Criteria for PE    Clinical signs and symptoms of DVT 0 Filed at: 11/11/2024 1654   PE is primary diagnosis or equally likely 0 Filed at: 11/11/2024 1654   HR >100 0 Filed at: 11/11/2024 1654   Immobilization at least 3 days or Surgery in the previous 4 weeks 0 Filed at: 11/11/2024 1654   Previous, objectively diagnosed PE or DVT 0 Filed at: 11/11/2024 1654   Hemoptysis 0 Filed at: 11/11/2024 1654   Malignancy with treatment within 6 months or palliative 0 Filed at: 11/11/2024 1654   Wells' Criteria Total 0 Filed at: 11/11/2024 1654                        History of Present Illness       Chief Complaint   Patient presents with    Abdominal Pain     Doctor found a \"bubble\" near heart about a year ago but never followed up. Pts pain moves from front chest wall to arm pit to r flank at times. Pain now in ULQ       Past Medical History:   Diagnosis Date    Disease of thyroid gland     Migraine       Past Surgical History:   Procedure Laterality Date    " CHOLECYSTECTOMY LAPAROSCOPIC N/A 12/16/2023    Procedure: CHOLECYSTECTOMY LAPAROSCOPIC;  Surgeon: Sang Goodwin MD;  Location: [facility];  Service: General    GALLBLADDER SURGERY N/A 12/2023      Family History   Problem Relation Age of Onset    Diabetes type II Mother     Diabetes type II Father     Heart disease Father     Thyroid disease Sister     Colon cancer Maternal Grandmother     Other Daughter       Social History     Tobacco Use    Smoking status: Never    Smokeless tobacco: Never   Vaping Use    Vaping status: Never Used   Substance Use Topics    Alcohol use: Never    Drug use: Never      E-Cigarette/Vaping    E-Cigarette Use Never User       E-Cigarette/Vaping Substances    Nicotine No     THC No     CBD No     Flavoring No     Unknown No       I have reviewed and agree with the history as documented.     49F otherwise healthy presenting for left sided chest pain. Had similar chest pain for the past year that was mostly intermittent but has become more constant in the past week. Pain is generally parasternal/to the lower chest. Contrary to triage note, denies radiation to jaw, arm, neck, back. No recent illness, no travel, no OCPs, no blood thinners, or hx of clotting issues. Was evaluated in the ED 1 year ago with similar presentation with incidental pericardial cyst for which she has not followed up on. No fevers, ha, abd pain, change in bladder/bowel function, blood in urine, or other acute symptoms.      Abdominal Pain  Associated symptoms: chest pain    Associated symptoms: no chills, no cough, no dysuria, no fever, no hematuria, no shortness of breath, no sore throat and no vomiting        Review of Systems   Constitutional:  Negative for chills and fever.   HENT:  Negative for congestion and sore throat.    Respiratory:  Negative for cough and shortness of breath.    Cardiovascular:  Positive for chest pain. Negative for palpitations.   Gastrointestinal:  Negative for abdominal pain and  vomiting.   Genitourinary:  Negative for dysuria and hematuria.   Musculoskeletal:  Negative for arthralgias and back pain.   Skin:  Negative for color change and rash.   Neurological:  Negative for syncope.   All other systems reviewed and are negative.          Objective       ED Triage Vitals [11/11/24 1452]   Temperature Pulse Blood Pressure Respirations SpO2 Patient Position - Orthostatic VS   98 °F (36.7 °C) 66 139/76 16 100 % Sitting      Temp Source Heart Rate Source BP Location FiO2 (%) Pain Score    Oral -- Right arm -- 7      Vitals      Date and Time Temp Pulse SpO2 Resp BP Pain Score FACES Pain Rating User   11/11/24 1452 98 °F (36.7 °C) 66 100 % 16 139/76 7 -- RLN            Physical Exam  Vitals and nursing note reviewed.   Constitutional:       General: She is not in acute distress.     Appearance: She is well-developed.   HENT:      Head: Normocephalic and atraumatic.   Eyes:      Conjunctiva/sclera: Conjunctivae normal.   Cardiovascular:      Rate and Rhythm: Normal rate and regular rhythm.      Heart sounds: No murmur heard.  Pulmonary:      Effort: Pulmonary effort is normal. No respiratory distress.      Breath sounds: Normal breath sounds. No wheezing, rhonchi or rales.   Chest:      Chest wall: No tenderness.   Abdominal:      Palpations: Abdomen is soft.      Tenderness: There is no abdominal tenderness.   Musculoskeletal:         General: No swelling.      Cervical back: Neck supple.   Skin:     General: Skin is warm and dry.      Capillary Refill: Capillary refill takes less than 2 seconds.   Neurological:      Mental Status: She is alert.   Psychiatric:         Mood and Affect: Mood normal.         Results Reviewed       Procedure Component Value Units Date/Time    D-dimer, quantitative [[id number]]  (Abnormal) Collected: 11/11/24 1620    Lab Status: Final result Specimen: Blood from Arm, Left Updated: 11/11/24 1654     D-Dimer, Quant 0.78 ug/ml FEU     Narrative:      In the evaluation for  possible pulmonary embolism, in the appropriate (Well's Score of 4 or less) patient, the age adjusted d-dimer cutoff for this patient can be calculated as:    Age x 0.01 (in ug/mL) for Age-adjusted D-dimer exclusion threshold for a patient over 50 years.    HS Troponin 0hr (reflex protocol) [[id number]]  (Normal) Collected: 11/11/24 1457    Lab Status: Final result Specimen: Blood from Arm, Right Updated: 11/11/24 1528     hs TnI 0hr <2 ng/L     Comprehensive metabolic panel [[id number]] Collected: 11/11/24 1457    Lab Status: Final result Specimen: Blood from Arm, Right Updated: 11/11/24 1520     Sodium 140 mmol/L      Potassium 3.6 mmol/L      Chloride 107 mmol/L      CO2 27 mmol/L      ANION GAP 6 mmol/L      BUN 16 mg/dL      Creatinine 0.63 mg/dL      Glucose 93 mg/dL      Calcium 9.1 mg/dL      AST 15 U/L      ALT 13 U/L      Alkaline Phosphatase 66 U/L      Total Protein 7.0 g/dL      Albumin 4.1 g/dL      Total Bilirubin 0.25 mg/dL      eGFR 104 ml/min/1.73sq m     Narrative:      [employer] guidelines for Chronic Kidney Disease (CKD):     Stage 1 with normal or high GFR (GFR > 90 mL/min/1.73 square meters)    Stage 2 Mild CKD (GFR = 60-89 mL/min/1.73 square meters)    Stage 3A Moderate CKD (GFR = 45-59 mL/min/1.73 square meters)    Stage 3B Moderate CKD (GFR = 30-44 mL/min/1.73 square meters)    Stage 4 Severe CKD (GFR = 15-29 mL/min/1.73 square meters)    Stage 5 End Stage CKD (GFR <15 mL/min/1.73 square meters)  Note: GFR calculation is accurate only with a steady state creatinine    Lipase [[id number]]  (Normal) Collected: 11/11/24 1457    Lab Status: Final result Specimen: Blood from Arm, Right Updated: 11/11/24 1520     Lipase 25 u/L     CBC and differential [[id number]]  (Abnormal) Collected: 11/11/24 1457    Lab Status: Final result Specimen: Blood from Arm, Right Updated: 11/11/24 1506     WBC 5.99 Thousand/uL      RBC 4.05 Million/uL      Hemoglobin 9.6 g/dL      Hematocrit 31.3  %      MCV 77 fL      MCH 23.7 pg      MCHC 30.7 g/dL      RDW 17.2 %      MPV 9.0 fL      Platelets 251 Thousands/uL      nRBC 0 /100 WBCs      Segmented % 56 %      Immature Grans % 0 %      Lymphocytes % 35 %      Monocytes % 6 %      Eosinophils Relative 2 %      Basophils Relative 1 %      Absolute Neutrophils 3.39 Thousands/µL      Absolute Immature Grans 0.02 Thousand/uL      Absolute Lymphocytes 2.09 Thousands/µL      Absolute Monocytes 0.36 Thousand/µL      Eosinophils Absolute 0.10 Thousand/µL      Basophils Absolute 0.03 Thousands/µL             CTA chest pe study   Final Interpretation by Luis Antonio Beckwith MD (11/11 1907)      No pulmonary embolism. Clear lungs.      Stable pericardial cyst.      Small hiatal hernia.            Workstation performed: [id number]         XR chest 1 view portable   ED Interpretation by Kristian Harris MD (11/11 1043)   No acute cardiopulmonary findings.      Final Interpretation by Anirudh Salinas MD (11/11 1626)      No acute cardiopulmonary disease.            Workstation performed: [id number]             ECG 12 Lead Documentation Only    Date/Time: 11/12/2024 1:11 AM    Performed by: Kristian Harris MD  Authorized by: Kristian Harris MD        ED Medication and Procedure Management   Prior to Admission Medications   Prescriptions Last Dose Informant Patient Reported? Taking?   Cholecalciferol (Vitamin D3) 50 MCG (2000 UT) capsule  Child No No   Sig: Take 1.5 capsules (3,000 Units total) by mouth daily   Patient not taking: Reported on 1/22/2024   omeprazole (PriLOSEC) 40 MG capsule  Child No No   Sig: TAKE 1 CAPSULE TWICE A DAY      Facility-Administered Medications: None     Discharge Medication List as of 11/11/2024  7:55 PM        CONTINUE these medications which have NOT CHANGED    Details   Cholecalciferol (Vitamin D3) 50 MCG (2000 UT) capsule Take 1.5 capsules (3,000 Units total) by mouth daily, Starting Wed 3/1/2023, No Print      omeprazole (PriLOSEC) 40 MG capsule TAKE 1 CAPSULE  TWICE A DAY, Starting Mon 11/6/2023, Normal           No discharge procedures on file.  ED SEPSIS DOCUMENTATION   Time reflects when diagnosis was documented in both MDM as applicable and the Disposition within this note       Time User Action Codes Description Comment    11/11/2024  7:55 PM Kristian Harris Add [R07.9] Chest pain                  Kristian Harris MD  11/12/24 0113

## 2025-04-09 ENCOUNTER — HOSPITAL ENCOUNTER (EMERGENCY)
Facility: HOSPITAL | Age: 50
Discharge: HOME | End: 2025-04-09
Attending: EMERGENCY MEDICINE
Payer: COMMERCIAL

## 2025-04-09 ENCOUNTER — APPOINTMENT (EMERGENCY)
Dept: RADIOLOGY | Facility: HOSPITAL | Age: 50
End: 2025-04-09
Payer: COMMERCIAL

## 2025-04-09 VITALS
RESPIRATION RATE: 16 BRPM | DIASTOLIC BLOOD PRESSURE: 74 MMHG | TEMPERATURE: 98 F | HEART RATE: 55 BPM | SYSTOLIC BLOOD PRESSURE: 165 MMHG | OXYGEN SATURATION: 100 %

## 2025-04-09 DIAGNOSIS — I16.0 HYPERTENSIVE URGENCY: ICD-10-CM

## 2025-04-09 DIAGNOSIS — R42 DIZZINESS: ICD-10-CM

## 2025-04-09 DIAGNOSIS — R03.0 ELEVATED BLOOD PRESSURE READING: Primary | ICD-10-CM

## 2025-04-09 LAB
2HR DELTA HS TROPONIN: 0 NG/L
ALBUMIN SERPL BCG-MCNC: 4.2 G/DL (ref 3.5–5)
ALP SERPL-CCNC: 78 U/L (ref 34–104)
ALT SERPL W P-5'-P-CCNC: 17 U/L (ref 7–52)
ANION GAP SERPL CALCULATED.3IONS-SCNC: 6 MMOL/L (ref 4–13)
AST SERPL W P-5'-P-CCNC: 17 U/L (ref 13–39)
ATRIAL RATE: 67 BPM
BASOPHILS # BLD AUTO: 0.04 THOUSANDS/ÂΜL (ref 0–0.1)
BASOPHILS NFR BLD AUTO: 1 % (ref 0–1)
BILIRUB SERPL-MCNC: 0.36 MG/DL (ref 0.2–1)
BUN SERPL-MCNC: 19 MG/DL (ref 5–25)
CALCIUM SERPL-MCNC: 9.3 MG/DL (ref 8.4–10.2)
CARDIAC TROPONIN I PNL SERPL HS: 3 NG/L (ref ?–50)
CARDIAC TROPONIN I PNL SERPL HS: 3 NG/L (ref ?–50)
CHLORIDE SERPL-SCNC: 105 MMOL/L (ref 96–108)
CO2 SERPL-SCNC: 28 MMOL/L (ref 21–32)
CREAT SERPL-MCNC: 0.67 MG/DL (ref 0.6–1.3)
EOSINOPHIL # BLD AUTO: 0.2 THOUSAND/ÂΜL (ref 0–0.61)
EOSINOPHIL NFR BLD AUTO: 4 % (ref 0–6)
ERYTHROCYTE [DISTWIDTH] IN BLOOD BY AUTOMATED COUNT: 19 % (ref 11.6–15.1)
EXT PREGNANCY TEST URINE: NEGATIVE
EXT. CONTROL: NORMAL
GFR SERPL CREATININE-BSD FRML MDRD: 101 ML/MIN/1.73SQ M
GLUCOSE SERPL-MCNC: 85 MG/DL (ref 65–140)
HCT VFR BLD AUTO: 33.2 % (ref 34.8–46.1)
HGB BLD-MCNC: 9.9 G/DL (ref 11.5–15.4)
IMM GRANULOCYTES # BLD AUTO: 0.01 THOUSAND/UL (ref 0–0.2)
IMM GRANULOCYTES NFR BLD AUTO: 0 % (ref 0–2)
LYMPHOCYTES # BLD AUTO: 2.21 THOUSANDS/ÂΜL (ref 0.6–4.47)
LYMPHOCYTES NFR BLD AUTO: 47 % (ref 14–44)
MCH RBC QN AUTO: 21.7 PG (ref 26.8–34.3)
MCHC RBC AUTO-ENTMCNC: 29.8 G/DL (ref 31.4–37.4)
MCV RBC AUTO: 73 FL (ref 82–98)
MONOCYTES # BLD AUTO: 0.34 THOUSAND/ÂΜL (ref 0.17–1.22)
MONOCYTES NFR BLD AUTO: 7 % (ref 4–12)
NEUTROPHILS # BLD AUTO: 1.95 THOUSANDS/ÂΜL (ref 1.85–7.62)
NEUTS SEG NFR BLD AUTO: 41 % (ref 43–75)
NRBC BLD AUTO-RTO: 0 /100 WBCS
P AXIS: 11 DEGREES
PLATELET # BLD AUTO: 276 THOUSANDS/UL (ref 149–390)
PMV BLD AUTO: 8.9 FL (ref 8.9–12.7)
POTASSIUM SERPL-SCNC: 4 MMOL/L (ref 3.5–5.3)
PR INTERVAL: 126 MS
PROT SERPL-MCNC: 7.3 G/DL (ref 6.4–8.4)
QRS AXIS: 37 DEGREES
QRSD INTERVAL: 84 MS
QT INTERVAL: 404 MS
QTC INTERVAL: 426 MS
RBC # BLD AUTO: 4.57 MILLION/UL (ref 3.81–5.12)
SODIUM SERPL-SCNC: 139 MMOL/L (ref 135–147)
T WAVE AXIS: 43 DEGREES
VENTRICULAR RATE: 67 BPM
WBC # BLD AUTO: 4.75 THOUSAND/UL (ref 4.31–10.16)

## 2025-04-09 PROCEDURE — 81025 URINE PREGNANCY TEST: CPT

## 2025-04-09 PROCEDURE — 71046 X-RAY EXAM CHEST 2 VIEWS: CPT

## 2025-04-09 PROCEDURE — 36415 COLL VENOUS BLD VENIPUNCTURE: CPT

## 2025-04-09 PROCEDURE — 99285 EMERGENCY DEPT VISIT HI MDM: CPT | Performed by: EMERGENCY MEDICINE

## 2025-04-09 PROCEDURE — 93010 ELECTROCARDIOGRAM REPORT: CPT | Performed by: INTERNAL MEDICINE

## 2025-04-09 PROCEDURE — 93005 ELECTROCARDIOGRAM TRACING: CPT

## 2025-04-09 PROCEDURE — 85025 COMPLETE CBC W/AUTO DIFF WBC: CPT

## 2025-04-09 PROCEDURE — 99284 EMERGENCY DEPT VISIT MOD MDM: CPT

## 2025-04-09 PROCEDURE — 80053 COMPREHEN METABOLIC PANEL: CPT

## 2025-04-09 PROCEDURE — 84484 ASSAY OF TROPONIN QUANT: CPT

## 2025-04-09 RX ORDER — LISINOPRIL 40 MG/1
40 TABLET ORAL DAILY
Qty: 20 TABLET | Refills: 0 | Status: SHIPPED | OUTPATIENT
Start: 2025-04-09

## 2025-04-09 RX ORDER — MECLIZINE HYDROCHLORIDE 25 MG/1
25 TABLET ORAL 3 TIMES DAILY PRN
Qty: 30 TABLET | Refills: 0 | Status: SHIPPED | OUTPATIENT
Start: 2025-04-09

## 2025-04-09 RX ORDER — LISINOPRIL 5 MG/1
5 TABLET ORAL DAILY
Qty: 20 TABLET | Refills: 0 | Status: SHIPPED | OUTPATIENT
Start: 2025-04-09 | End: 2025-04-09

## 2025-04-09 RX ORDER — LISINOPRIL 5 MG/1
5 TABLET ORAL ONCE
Status: COMPLETED | OUTPATIENT
Start: 2025-04-09 | End: 2025-04-09

## 2025-04-09 RX ADMIN — LISINOPRIL 5 MG: 5 TABLET ORAL at 14:26

## 2025-04-09 NOTE — ED ATTENDING ATTESTATION
4/9/2025  I, Gladys Nixon MD, saw and evaluated the patient. I have discussed the patient with the resident/non-physician practitioner and agree with the resident's/non-physician practitioner's findings, Plan of Care, and MDM as documented in the resident's/non-physician practitioner's note, except where noted. All available labs and Radiology studies were reviewed.  I was present for key portions of any procedure(s) performed by the resident/non-physician practitioner and I was immediately available to provide assistance.       At this point I agree with the current assessment done in the Emergency Department.  I have conducted an independent evaluation of this patient a history and physical is as follows:    This is a 50-year-old female without any significant related past medical history presenting to the ED today for multiple complaints.  Patient states that she has elevated blood pressure, has had it for the past few days.  Associated with his blood pressure she has had some chest pain.  It is substernal, nonradiating, without any other significantly associated symptoms.  She has not had any shortness of breath.  She has peripheral edema, which is chronic for her.  It is symmetrical.  She has not had any calf tenderness or swelling.  She denies any other complaints aside from some intermittent vertigo.  Patient states that the vertigo has been present for the past couple of days, and she states that her head feels like it is spinning.  She also has some tinnitus associated.  She has never had a stroke, has not had any heart attacks, is not any medication, has not traveled recently.  She has, however, had recent viral illness within the last 2 to 3 weeks.  She denies any current fever chills or sweats, cough or congestion, or any other complaints.  On exam she is hypertensive, albeit improved from her previous blood pressure measurement.  Today she in the room most recently it is 172/72.  She does not  have any focal neurologic abnormalities.  Her strength, sensation are both intact, symmetrical bilaterally.  Her extraocular movements are intact, pupils are equal round reactive, and she does not have any facial symmetry or droop.  She has TMs which were bilaterally bulging, however without any opacity.  I do think that she probably has an effusion, most likely from her recent viral illnesses.  Her differential diagnosis includes: Atypical ACS versus hypertensive urgency/emergency versus peripheral vertigo versus central vertigo versus other.  Patient has a normal neurologic exam, and I do not believe that she has any signs of central vertigo.  She also has tinnitus, which in the absence of acoustic neuroma, would be more likely in somebody with a peripheral vertigo.  Patient did admit to a recent viral illness, she does not have bilateral bulging TMs, fitting with a vestibular neuritis.  Patient as far as her blood pressure in her chest pain are concerned, I do think that is likely due to her not seeing any doctors for quite some time.  I do think that her blood pressure probably has been elevated for some time however she has not noticed it.  Patient underwent an EKG which was unremarkable.  Her CBC showed a slight anemia, but this is chronic for her.  Her metabolic panel, troponins are unremarkable.  Patient was started on lisinopril.  She will follow-up with her PCP as an outpatient.  Patient also started on meclizine.  The management plan was discussed in detail with the patient at bedside and all questions were answered. Strict ED return instructions were discussed at bedside. Prior to discharge, both verbal and written instructions were provided. We discussed the signs and symptoms that should prompt the patient to return to the ED. All questions were answered and the patient was comfortable with the plan of care and discharged home. The patient agrees to return to the Emergency Department for concerns  "and/or progression of illness.    Portions of the above record have been created with voice recognition software.  Occasional wrong word or \"sound alike\" substitutions may have occurred due to the inherent limitations of voice recognition software.  Read the chart carefully and recognize, using context, where substitutions may have occurred.    ED Course         Critical Care Time  Procedures      "

## 2025-04-09 NOTE — ED PROCEDURE NOTE
Procedure  POC Cardiac US    Date/Time: 4/9/2025 2:03 PM    Performed by: Mukund Verde DO  Authorized by: Mukund Verde DO    Patient location:  ED  Procedure details:     Exam Type:  Educational    Indications comment:  Hypertension    Assessment / Evaluation for: cardiac function      Exam Type: initial exam      Image quality: limited diagnostic      Image availability:  Images available in PACS and video obtained  Patient Details:     Cardiac Rhythm:  Regular    Mechanical ventilation: No    Cardiac findings:     Views obtained: parasternal long axis, parasternal short axis, subcostal and apical      Pericardial effusion: absent      Tamponade physiology: absent      Wall motion: normal    Pulmonary findings:     Left Lung Findings: left lung sliding    Interpretation:     Fluid Status:  Euvolemic                   Mukund Verde DO  04/09/25 1407

## 2025-04-10 NOTE — ED PROVIDER NOTES
Time reflects when diagnosis was documented in both MDM as applicable and the Disposition within this note       Time User Action Codes Description Comment    4/9/2025  2:22 PM Danika Mendoza [R03.0] Elevated blood pressure reading     4/9/2025  2:22 PM Danika Mendoza [I16.0] Hypertensive urgency     4/9/2025  2:36 PM Danika Mendoza [R42] Dizziness           ED Disposition       ED Disposition   Discharge    Condition   Stable    Date/Time   Wed Apr 9, 2025  2:22 PM    Comment   Shavonne Aarons discharge to home/self care.                   Assessment & Plan       Medical Decision Making  Amount and/or Complexity of Data Reviewed  Labs: ordered. Decision-making details documented in ED Course.  Radiology: ordered. Decision-making details documented in ED Course.    Risk  Prescription drug management.    See ED course for MDM.      ED Course as of 04/10/25 0827   Wed Apr 09, 2025   1200 Differential diagnosis including but not limited to: hypertension, hypertensive urgency, end organ damage, renal failure, metabolic abnormality     1201 Offered , however patient declined would prefer to have her daughter at bedside translate for her.   1213 Hemoglobin(!): 9.9  Appears to be at baseline   1213 hs TnI 0hr: 3   1335 Delta 2hr hsTnI: 0   1428 Procedure Note: EKG  Date/Time: 04/09/25 2:28 PM   Interpreted by: Danika Mendoza MD  Indications / Diagnosis: chest pain  ECG reviewed by me, the ED Physician: yes   The EKG demonstrates:  Rhythm: normal sinus  Rate: 67  Intervals: normal intervals  Axis: normal axis  QRS/Blocks: normal QRS  ST Changes: No acute ST Changes, no STD/HENOK.     1430 XR chest 2 views  No acute cardiopulmonary disease.   1445 Reevaluated patient, states she feels improved.  Patient did endorse intermittent dizziness upon presentation, given the character of her symptoms, with her recent viral URI symptoms with tinnitus, this is peripheral, not central.  Will provide meclizine upon discharge.      Repeat blood pressure 165/74.  Given that patient did not have any endorgan damage secondary to her elevated blood pressure readings, offered starting patient on antihypertensives at this time.  Patient is agreeable, decided to start patient on lisinopril, with Rx sent to pharmacy.  Also instructed patient to take daily blood pressure readings and log it, to provide for her primary care provider upon follow-up within the next week.  Return precautions discussed, such as worsening symptoms, chest pain, shortness of breath.  Patient voices understanding and agrees with plan.  All questions and concerns addressed at this time.       Medications   lisinopril (ZESTRIL) tablet 5 mg (5 mg Oral Given 4/9/25 1426)       ED Risk Strat Scores   HEART Risk Score      Flowsheet Row Most Recent Value   Heart Score Risk Calculator    History 1 Filed at: 04/09/2025 1424   ECG 0 Filed at: 04/09/2025 1424   Age 1 Filed at: 04/09/2025 1424   Risk Factors 1 Filed at: 04/09/2025 1424   Troponin 0 Filed at: 04/09/2025 1424   HEART Score 3 Filed at: 04/09/2025 1424          HEART Risk Score      Flowsheet Row Most Recent Value   Heart Score Risk Calculator    History 1 Filed at: 04/09/2025 1424   ECG 0 Filed at: 04/09/2025 1424   Age 1 Filed at: 04/09/2025 1424   Risk Factors 1 Filed at: 04/09/2025 1424   Troponin 0 Filed at: 04/09/2025 1424   HEART Score 3 Filed at: 04/09/2025 1424                      No data recorded                            History of Present Illness       Chief Complaint   Patient presents with    High Blood Pressure     /101 yesterday. Today SBP 150s-160s. C/o chest pain, dizziness, headache, palpitations. No htn dx.        Past Medical History:   Diagnosis Date    Disease of thyroid gland     Migraine       Past Surgical History:   Procedure Laterality Date    CHOLECYSTECTOMY LAPAROSCOPIC N/A 12/16/2023    Procedure: CHOLECYSTECTOMY LAPAROSCOPIC;  Surgeon: Sang Goodwin MD;  Location: [facility];   Service: General    GALLBLADDER SURGERY N/A 12/2023      Family History   Problem Relation Age of Onset    Diabetes type II Mother     Diabetes type II Father     Heart disease Father     Thyroid disease Sister     Colon cancer Maternal Grandmother     Other Daughter       Social History     Tobacco Use    Smoking status: Never    Smokeless tobacco: Never   Vaping Use    Vaping status: Never Used   Substance Use Topics    Alcohol use: Never    Drug use: Never      E-Cigarette/Vaping    E-Cigarette Use Never User       E-Cigarette/Vaping Substances    Nicotine No     THC No     CBD No     Flavoring No     Unknown No       I have reviewed and agree with the history as documented.     HPI  Patient is a 50-year-old female presenting for elevated blood pressure readings noted yesterday.  Patient notes she was in her normal state of health, when she noted feeling generally unwell, dizziness, intermittent chest pain.  Last episode of chest pain was in the waiting room.  She noted her blood pressure readings were in the 170s systolic, presented to the ER for evaluation.  Does not take any blood pressure medications.  Currently denies chest pain, endorses chest tightness, mild dizziness, denies weakness/numbness of extremities, nausea/vomiting, diaphoresis, shortness of breath, fevers, or any other symptoms at this time.      Review of Systems  As per [facility]      Objective       ED Triage Vitals   Temperature Pulse Blood Pressure Respirations SpO2 Patient Position - Orthostatic VS   04/09/25 1020 04/09/25 1020 04/09/25 1020 04/09/25 1020 04/09/25 1020 04/09/25 1020   98 °F (36.7 °C) 65 (!) 209/81 20 100 % Sitting      Temp src Heart Rate Source BP Location FiO2 (%) Pain Score    -- 04/09/25 1020 04/09/25 1020 -- 04/09/25 1426     Monitor Right arm  No Pain      Vitals      Date and Time Temp Pulse SpO2 Resp BP Pain Score FACES Pain Rating User   04/09/25 1426 -- 55 100 % 16 165/74 No Pain -- JDT   04/09/25 1330 -- 49 99 % 18  197/84 -- -- JDT   04/09/25 1245 -- 53 99 % 18 178/82 -- -- JDT   04/09/25 1100 -- 56 98 % 20 172/72 -- -- LA   04/09/25 1020 98 °F (36.7 °C) 65 100 % 20 209/81 -- -- KM            Physical Exam  Vitals and nursing note reviewed.   Constitutional:       Appearance: She is not toxic-appearing or diaphoretic.   HENT:      Head: Normocephalic and atraumatic.      Right Ear: External ear normal.      Left Ear: External ear normal.      Nose: Nose normal.      Mouth/Throat:      Mouth: Mucous membranes are moist.   Eyes:      General: No scleral icterus.     Extraocular Movements: Extraocular movements intact.      Conjunctiva/sclera: Conjunctivae normal.   Cardiovascular:      Rate and Rhythm: Normal rate and regular rhythm.      Pulses: Normal pulses.           Radial pulses are 2+ on the right side.        Dorsalis pedis pulses are 2+ on the right side and 2+ on the left side.      Heart sounds: Normal heart sounds, S1 normal and S2 normal. No murmur heard.  Pulmonary:      Effort: Pulmonary effort is normal. No respiratory distress.      Breath sounds: Normal breath sounds. No stridor. No wheezing.   Abdominal:      Palpations: Abdomen is soft.      Tenderness: There is no abdominal tenderness. There is no guarding or rebound.   Musculoskeletal:         General: Normal range of motion.      Cervical back: Normal range of motion.   Skin:     General: Skin is warm and dry.   Neurological:      General: No focal deficit present.      Mental Status: She is alert and oriented to person, place, and time.      Comments: No visual field deficits, denies changes in visual acuity  Pupils PERRLA, EOM intact  Sensation intact and equal in upper, middle, and lower face  Able to open mouth fully, no TMJ tenderness, able to puff out cheeks, able keep eyes closed through tension  No facial droop or dysarthria  Able to hear finger rub equally b/l  Able to stick out tongue and move in both directions  No uvular deviation  Able to turn  head and shrug shoulders against resistance  Finger to nose and heel to shin normal. Gait not assessed.     Psychiatric:         Mood and Affect: Mood normal.         Results Reviewed       Procedure Component Value Units Date/Time    HS Troponin I 2hr [[id number]]  (Normal) Collected: 04/09/25 1241    Lab Status: Final result Specimen: Blood from Arm, Right Updated: 04/09/25 1309     hs TnI 2hr 3 ng/L      Delta 2hr hsTnI 0 ng/L     POCT pregnancy, urine [[id number]]  (Normal) Collected: 04/09/25 1233    Lab Status: Final result Updated: 04/09/25 1233     EXT Preg Test, Ur Negative     Control Valid    HS Troponin 0hr (reflex protocol) [[id number]]  (Normal) Collected: 04/09/25 1027    Lab Status: Final result Specimen: Blood from Arm, Right Updated: 04/09/25 1107     hs TnI 0hr 3 ng/L     Comprehensive metabolic panel [[id number]] Collected: 04/09/25 1027    Lab Status: Final result Specimen: Blood from Arm, Right Updated: 04/09/25 1053     Sodium 139 mmol/L      Potassium 4.0 mmol/L      Chloride 105 mmol/L      CO2 28 mmol/L      ANION GAP 6 mmol/L      BUN 19 mg/dL      Creatinine 0.67 mg/dL      Glucose 85 mg/dL      Calcium 9.3 mg/dL      AST 17 U/L      ALT 17 U/L      Alkaline Phosphatase 78 U/L      Total Protein 7.3 g/dL      Albumin 4.2 g/dL      Total Bilirubin 0.36 mg/dL      eGFR 101 ml/min/1.73sq m     Narrative:      [employer] guidelines for Chronic Kidney Disease (CKD):     Stage 1 with normal or high GFR (GFR > 90 mL/min/1.73 square meters)    Stage 2 Mild CKD (GFR = 60-89 mL/min/1.73 square meters)    Stage 3A Moderate CKD (GFR = 45-59 mL/min/1.73 square meters)    Stage 3B Moderate CKD (GFR = 30-44 mL/min/1.73 square meters)    Stage 4 Severe CKD (GFR = 15-29 mL/min/1.73 square meters)    Stage 5 End Stage CKD (GFR <15 mL/min/1.73 square meters)  Note: GFR calculation is accurate only with a steady state creatinine    CBC and differential [[id number]]  (Abnormal) Collected:  04/09/25 1027    Lab Status: Final result Specimen: Blood from Arm, Right Updated: 04/09/25 1039     WBC 4.75 Thousand/uL      RBC 4.57 Million/uL      Hemoglobin 9.9 g/dL      Hematocrit 33.2 %      MCV 73 fL      MCH 21.7 pg      MCHC 29.8 g/dL      RDW 19.0 %      MPV 8.9 fL      Platelets 276 Thousands/uL      nRBC 0 /100 WBCs      Segmented % 41 %      Immature Grans % 0 %      Lymphocytes % 47 %      Monocytes % 7 %      Eosinophils Relative 4 %      Basophils Relative 1 %      Absolute Neutrophils 1.95 Thousands/µL      Absolute Immature Grans 0.01 Thousand/uL      Absolute Lymphocytes 2.21 Thousands/µL      Absolute Monocytes 0.34 Thousand/µL      Eosinophils Absolute 0.20 Thousand/µL      Basophils Absolute 0.04 Thousands/µL             XR chest 2 views   Final Interpretation by Jeffrey Spencer MD (04/09 1305)      No acute cardiopulmonary disease.            Workstation performed: [id number]             ECG 12 Lead Documentation Only    Date/Time: 4/9/2025 11:00 AM    Performed by: Danika Mendoza MD  Authorized by: Danika Mendoza MD    ECG reviewed by me, the ED Provider: yes    Patient location:  ED  Comments:      Normal sinus rhythm, rate 67, normal axis, normal QRS, no ST or T wave abnormalities.      ED Medication and Procedure Management   Prior to Admission Medications   Prescriptions Last Dose Informant Patient Reported? Taking?   Cholecalciferol (Vitamin D3) 50 MCG (2000 UT) capsule  Child No No   Sig: Take 1.5 capsules (3,000 Units total) by mouth daily   Patient not taking: Reported on 1/22/2024   omeprazole (PriLOSEC) 40 MG capsule  Child No No   Sig: TAKE 1 CAPSULE TWICE A DAY      Facility-Administered Medications: None     Discharge Medication List as of 4/9/2025  2:29 PM        START taking these medications    Details   lisinopril (ZESTRIL) 5 mg tablet Take 1 tablet (5 mg total) by mouth daily, Starting Wed 4/9/2025, Normal           CONTINUE these medications which have NOT CHANGED     Details   Cholecalciferol (Vitamin D3) 50 MCG (2000 UT) capsule Take 1.5 capsules (3,000 Units total) by mouth daily, Starting Wed 3/1/2023, No Print      omeprazole (PriLOSEC) 40 MG capsule TAKE 1 CAPSULE TWICE A DAY, Starting Mon 11/6/2023, Normal           No discharge procedures on file.  ED SEPSIS DOCUMENTATION   Time reflects when diagnosis was documented in both MDM as applicable and the Disposition within this note       Time User Action Codes Description Comment    4/9/2025  2:22 PM Danika Mendoza [R03.0] Elevated blood pressure reading     4/9/2025  2:22 PM Danika Mendoza [I16.0] Hypertensive urgency     4/9/2025  2:36 PM Danika Mendoza [R42] Dizziness                  Danika Mendoza MD  04/10/25 0830

## 2025-05-06 ENCOUNTER — OFFICE VISIT (OUTPATIENT)
Age: 50
End: 2025-05-06
Payer: COMMERCIAL

## 2025-05-06 VITALS
TEMPERATURE: 97.9 F | BODY MASS INDEX: 32.39 KG/M2 | HEIGHT: 60 IN | WEIGHT: 165 LBS | RESPIRATION RATE: 16 BRPM | OXYGEN SATURATION: 99 % | SYSTOLIC BLOOD PRESSURE: 120 MMHG | HEART RATE: 74 BPM | DIASTOLIC BLOOD PRESSURE: 72 MMHG

## 2025-05-06 DIAGNOSIS — I10 HYPERTENSION, UNSPECIFIED TYPE: ICD-10-CM

## 2025-05-06 DIAGNOSIS — R94.6 ABNORMAL THYROID FUNCTION TEST: ICD-10-CM

## 2025-05-06 DIAGNOSIS — R76.8 THYROID ANTIBODY POSITIVE: ICD-10-CM

## 2025-05-06 DIAGNOSIS — Z12.31 ENCOUNTER FOR SCREENING MAMMOGRAM FOR BREAST CANCER: ICD-10-CM

## 2025-05-06 DIAGNOSIS — E05.90 HYPERTHYROIDISM: ICD-10-CM

## 2025-05-06 DIAGNOSIS — Z00.00 HEALTHCARE MAINTENANCE: Primary | ICD-10-CM

## 2025-05-06 DIAGNOSIS — G47.33 OSA (OBSTRUCTIVE SLEEP APNEA): ICD-10-CM

## 2025-05-06 PROCEDURE — 99215 OFFICE O/P EST HI 40 MIN: CPT | Performed by: INTERNAL MEDICINE

## 2025-05-06 NOTE — ASSESSMENT & PLAN NOTE
Patient is a 50-year-old female with a history of multiple problems including hyperthyroidism, gastroesophageal reflux disease and history of some hypertension in the past.  Patient is here today for reestablishment in our medical practice.  Apparently patient has not been seen in extended period time because of lack of insurance.  Was seen recently in the emergency room with elevated blood pressure reading.  After workup and evaluation ruled out for possible cardiac ischemia patient was placed on medications specifically lisinopril at a high dose of 40 mg daily.  Patient has been checking her blood pressures at home and it is showing adequate control but patient is complaining of some problems with lethargy with the medication.  No shortness of breath or cough.  Other than checking to make sure that she does not have any cardiac ischemia she had no routine labs performed and interestingly no thyroid function studies were noted especially with gross evaluation showing an enlarged thyroid gland and goiter.  Patient's blood pressure today is controlled and oxygenation O2 sat is 99%.  She underwent physical exam today in the office and will go for routine lab testing now including basic labs and lipid profile but along with this also thyroid function testing, thyroid antibody testing, ultrasound of the thyroid gland, mammogram.  Will return to the office in a few weeks for reevaluation and further treatment if needed.    Orders:    Basic metabolic panel; Future    US thyroid; Future    TSH, 3rd generation with Free T4 reflex; Future    Lipid panel; Future    CBC and differential; Future    Thyroid stimulating immunoglobulin; Future    Ambulatory Referral to Endocrinology; Future

## 2025-05-06 NOTE — ASSESSMENT & PLAN NOTE
Patient was seen in the emergency room did have a basic evaluation.  Was placed on medication with the original thought starting lisinopril at 5 mg daily but the consensus was to put her on a higher dose of 20 mg daily.  Patient is complaining of some fatigue when she is on this dose of medication but blood pressure is controlled as noted today.  Prior to making any adjustments with medication we will get routine labs performed.    Orders:    Thyroid stimulating immunoglobulin; Future

## 2025-05-06 NOTE — PROGRESS NOTES
Name: Shavonne Campos      : 1975      MRN: [id number]  Encounter Provider: Jacob Butt DO  Encounter Date: 2025   Encounter department: [facility]    Assessment & Plan  Encounter for screening mammogram for breast cancer    Orders:  •  Mammo screening bilateral w 3d and cad; Future    Abnormal thyroid function test    Orders:  •  Thyroid stimulating immunoglobulin; Future  •  Ambulatory Referral to Endocrinology; Future    Hypertension, unspecified type  Patient was seen in the emergency room did have a basic evaluation.  Was placed on medication with the original thought starting lisinopril at 5 mg daily but the consensus was to put her on a higher dose of 20 mg daily.  Patient is complaining of some fatigue when she is on this dose of medication but blood pressure is controlled as noted today.  Prior to making any adjustments with medication we will get routine labs performed.    Orders:  •  Thyroid stimulating immunoglobulin; Future    Hyperthyroidism  History of hyperthyroidism in the past.  Patient is presenting with a goiter, enlarged thyroid tissue on examination.  This is nontender.  Patient will be going for thyroid function studies and ultrasound of the thyroid gland.  We placed a consult for the patient to be seen and evaluated by endocrinology.    Orders:  •  Thyroid stimulating immunoglobulin; Future  •  Ambulatory Referral to Endocrinology; Future    RAJESH (obstructive sleep apnea)  Patient does have a history of sleep apnea.  Apparently she has a CPAP machine at home but is not using this and we discussed with the patient the importance of using that machine on a nightly basis.  Hopefully patient will restart this and we will discuss this with her next visit         Healthcare maintenance  Patient is a 50-year-old female with a history of multiple problems including hyperthyroidism, gastroesophageal reflux disease and history of some hypertension in the past.   Patient is here today for reestablishment in our medical practice.  Apparently patient has not been seen in extended period time because of lack of insurance.  Was seen recently in the emergency room with elevated blood pressure reading.  After workup and evaluation ruled out for possible cardiac ischemia patient was placed on medications specifically lisinopril at a high dose of 40 mg daily.  Patient has been checking her blood pressures at home and it is showing adequate control but patient is complaining of some problems with lethargy with the medication.  No shortness of breath or cough.  Other than checking to make sure that she does not have any cardiac ischemia she had no routine labs performed and interestingly no thyroid function studies were noted especially with gross evaluation showing an enlarged thyroid gland and goiter.  Patient's blood pressure today is controlled and oxygenation O2 sat is 99%.  She underwent physical exam today in the office and will go for routine lab testing now including basic labs and lipid profile but along with this also thyroid function testing, thyroid antibody testing, ultrasound of the thyroid gland, mammogram.  Will return to the office in a few weeks for reevaluation and further treatment if needed.    Orders:  •  Basic metabolic panel; Future  •  US thyroid; Future  •  TSH, 3rd generation with Free T4 reflex; Future  •  Lipid panel; Future  •  CBC and differential; Future  •  Thyroid stimulating immunoglobulin; Future  •  Ambulatory Referral to Endocrinology; Future    Thyroid antibody positive  Notable thyromegaly on exam.  Possible goiter.  Check thyroid function tests ultrasound of the thyroid gland and consult evaluation by endocrinology              History of Present Illness     Patient is a 50-year-old female with a history of multiple medical issues as outlined previously who is here today to reestablish care in the medical practice.  Was recently seen in the  emergency room because of elevated blood pressure readings and was placed on medication and had a basic workup and evaluation to make sure there was no cardiac ischemia which was negative.  Patient is remaining on lisinopril 40 mg daily and blood pressure is controlled but she states the medication does cause some lethargy, fatigue and also headache.  Review of Systems   Constitutional:  Positive for fatigue. Negative for activity change, appetite change, chills, diaphoresis, fever and unexpected weight change.   HENT: Negative.     Eyes: Negative.    Respiratory:  Positive for cough. Negative for apnea, choking, chest tightness, shortness of breath, wheezing and stridor.         Slightly cough at nighttime which we do not feel is secondary to the lisinopril but of course this would be a concern   Cardiovascular: Negative.    Gastrointestinal: Negative.    Endocrine: Negative.    Genitourinary: Negative.    Musculoskeletal: Negative.    Skin: Negative.    Allergic/Immunologic: Negative.    Neurological:  Positive for light-headedness and headaches. Negative for dizziness, tremors, seizures, syncope, facial asymmetry, speech difficulty, weakness and numbness.   Hematological: Negative.    Psychiatric/Behavioral: Negative.     Past Medical History:   Diagnosis Date   • Disease of thyroid gland    • Migraine      Past Surgical History:   Procedure Laterality Date   • CHOLECYSTECTOMY LAPAROSCOPIC N/A 12/16/2023    Procedure: CHOLECYSTECTOMY LAPAROSCOPIC;  Surgeon: Sang Goodwin MD;  Location: [facility];  Service: General   • GALLBLADDER SURGERY N/A 12/2023     Family History   Problem Relation Age of Onset   • Diabetes type II Mother    • Diabetes type II Father    • Heart disease Father    • Thyroid disease Sister    • Colon cancer Maternal Grandmother    • Other Daughter      Social History     Tobacco Use   • Smoking status: Never   • Smokeless tobacco: Never   Vaping Use   • Vaping status: Never Used    Substance and Sexual Activity   • Alcohol use: Never   • Drug use: Never   • Sexual activity: Not Currently     Partners: Male     Current Outpatient Medications on File Prior to Visit   Medication Sig   • lisinopril (ZESTRIL) 40 mg tablet Take 1 tablet (40 mg total) by mouth daily   • meclizine (ANTIVERT) 25 mg tablet Take 1 tablet (25 mg total) by mouth 3 (three) times a day as needed for dizziness   • omeprazole (PriLOSEC) 40 MG capsule TAKE 1 CAPSULE TWICE A DAY   • Cholecalciferol (Vitamin D3) 50 MCG (2000 UT) capsule Take 1.5 capsules (3,000 Units total) by mouth daily (Patient not taking: Reported on 1/22/2024)     No Known Allergies  Immunization History   Administered Date(s) Administered   • COVID-19 PFIZER VACCINE 0.3 ML IM 04/23/2021, 05/15/2021   • COVID-19 Pfizer vac (Hamilton-sucrose, gray cap) 12 yr+ IM 03/09/2022     Objective   /72   Pulse 74   Temp 97.9 °F (36.6 °C)   Resp 16   Ht 5' (1.524 m)   Wt 74.8 kg (165 lb)   SpO2 99%   BMI 32.22 kg/m²     Physical Exam  Vitals and nursing note reviewed.   Constitutional:       General: She is not in acute distress.     Appearance: Normal appearance. She is obese. She is not ill-appearing, toxic-appearing or diaphoretic.      Comments: Pleasant, obese 50-year-old female who is awake alert accompanied by friend that is activities as .  Broken English.  In no acute distress   HENT:      Head: Normocephalic and atraumatic.      Right Ear: Tympanic membrane, ear canal and external ear normal. There is no impacted cerumen.      Left Ear: Tympanic membrane, ear canal and external ear normal. There is no impacted cerumen.      Nose: Nose normal. No congestion or rhinorrhea.      Mouth/Throat:      Mouth: Mucous membranes are moist.      Pharynx: Oropharynx is clear. No oropharyngeal exudate or posterior oropharyngeal erythema.      Comments: Thick tongue and some obstruction of oral airway  Eyes:      General: No scleral icterus.         Right eye: No discharge.         Left eye: No discharge.      Extraocular Movements: Extraocular movements intact.      Conjunctiva/sclera: Conjunctivae normal.      Pupils: Pupils are equal, round, and reactive to light.   Neck:      Vascular: No carotid bruit.      Comments: Thyromegaly both lobes, nontender to palpation  Cardiovascular:      Rate and Rhythm: Normal rate and regular rhythm.      Pulses: Normal pulses.      Heart sounds: Normal heart sounds. No murmur heard.     No friction rub. No gallop.   Pulmonary:      Effort: Pulmonary effort is normal. No respiratory distress.      Breath sounds: Normal breath sounds. No stridor. No wheezing, rhonchi or rales.   Chest:      Chest wall: No tenderness.   Abdominal:      General: Abdomen is flat. Bowel sounds are normal. There is no distension.      Palpations: Abdomen is soft. There is no mass.      Tenderness: There is no abdominal tenderness. There is no right CVA tenderness, left CVA tenderness, guarding or rebound.      Hernia: No hernia is present.   Musculoskeletal:         General: No swelling, tenderness, deformity or signs of injury. Normal range of motion.      Cervical back: Normal range of motion and neck supple. No rigidity or tenderness.      Right lower leg: No edema.      Left lower leg: No edema.   Lymphadenopathy:      Cervical: No cervical adenopathy.   Skin:     General: Skin is warm and dry.      Capillary Refill: Capillary refill takes less than 2 seconds.      Coloration: Skin is not jaundiced or pale.      Findings: No bruising, erythema, lesion or rash.   Neurological:      General: No focal deficit present.      Mental Status: She is alert and oriented to person, place, and time. Mental status is at baseline.      Cranial Nerves: No cranial nerve deficit.      Sensory: No sensory deficit.      Motor: No weakness.      Coordination: Coordination normal.      Gait: Gait normal.      Deep Tendon Reflexes: Reflexes normal.   Psychiatric:          Mood and Affect: Mood normal.         Behavior: Behavior normal.         Thought Content: Thought content normal.         Judgment: Judgment normal.

## 2025-05-06 NOTE — ASSESSMENT & PLAN NOTE
Orders:    Thyroid stimulating immunoglobulin; Future    Ambulatory Referral to Endocrinology; Future

## 2025-05-06 NOTE — ASSESSMENT & PLAN NOTE
History of hyperthyroidism in the past.  Patient is presenting with a goiter, enlarged thyroid tissue on examination.  This is nontender.  Patient will be going for thyroid function studies and ultrasound of the thyroid gland.  We placed a consult for the patient to be seen and evaluated by endocrinology.    Orders:    Thyroid stimulating immunoglobulin; Future    Ambulatory Referral to Endocrinology; Future

## 2025-05-06 NOTE — ASSESSMENT & PLAN NOTE
Patient does have a history of sleep apnea.  Apparently she has a CPAP machine at home but is not using this and we discussed with the patient the importance of using that machine on a nightly basis.  Hopefully patient will restart this and we will discuss this with her next visit

## 2025-05-06 NOTE — ASSESSMENT & PLAN NOTE
Notable thyromegaly on exam.  Possible goiter.  Check thyroid function tests ultrasound of the thyroid gland and consult evaluation by endocrinology

## 2025-05-09 ENCOUNTER — APPOINTMENT (OUTPATIENT)
Dept: LAB | Facility: CLINIC | Age: 50
End: 2025-05-09
Payer: COMMERCIAL

## 2025-05-09 DIAGNOSIS — E05.90 HYPERTHYROIDISM: ICD-10-CM

## 2025-05-09 DIAGNOSIS — I10 HYPERTENSION, UNSPECIFIED TYPE: ICD-10-CM

## 2025-05-09 DIAGNOSIS — R94.6 ABNORMAL THYROID FUNCTION TEST: ICD-10-CM

## 2025-05-09 DIAGNOSIS — Z00.00 HEALTHCARE MAINTENANCE: ICD-10-CM

## 2025-05-09 LAB
ANION GAP SERPL CALCULATED.3IONS-SCNC: 3 MMOL/L (ref 4–13)
BASOPHILS # BLD AUTO: 0.02 THOUSANDS/ÂΜL (ref 0–0.1)
BASOPHILS NFR BLD AUTO: 0 % (ref 0–1)
BUN SERPL-MCNC: 19 MG/DL (ref 5–25)
CALCIUM SERPL-MCNC: 9.4 MG/DL (ref 8.4–10.2)
CHLORIDE SERPL-SCNC: 105 MMOL/L (ref 96–108)
CHOLEST SERPL-MCNC: 227 MG/DL (ref ?–200)
CO2 SERPL-SCNC: 31 MMOL/L (ref 21–32)
CREAT SERPL-MCNC: 0.65 MG/DL (ref 0.6–1.3)
EOSINOPHIL # BLD AUTO: 0.15 THOUSAND/ÂΜL (ref 0–0.61)
EOSINOPHIL NFR BLD AUTO: 3 % (ref 0–6)
ERYTHROCYTE [DISTWIDTH] IN BLOOD BY AUTOMATED COUNT: 19.2 % (ref 11.6–15.1)
GFR SERPL CREATININE-BSD FRML MDRD: 102 ML/MIN/1.73SQ M
GLUCOSE P FAST SERPL-MCNC: 88 MG/DL (ref 65–99)
HCT VFR BLD AUTO: 35 % (ref 34.8–46.1)
HDLC SERPL-MCNC: 56 MG/DL
HGB BLD-MCNC: 10.3 G/DL (ref 11.5–15.4)
IMM GRANULOCYTES # BLD AUTO: 0.01 THOUSAND/UL (ref 0–0.2)
IMM GRANULOCYTES NFR BLD AUTO: 0 % (ref 0–2)
LDLC SERPL CALC-MCNC: 149 MG/DL (ref 0–100)
LYMPHOCYTES # BLD AUTO: 1.86 THOUSANDS/ÂΜL (ref 0.6–4.47)
LYMPHOCYTES NFR BLD AUTO: 39 % (ref 14–44)
MCH RBC QN AUTO: 21.7 PG (ref 26.8–34.3)
MCHC RBC AUTO-ENTMCNC: 29.4 G/DL (ref 31.4–37.4)
MCV RBC AUTO: 74 FL (ref 82–98)
MONOCYTES # BLD AUTO: 0.32 THOUSAND/ÂΜL (ref 0.17–1.22)
MONOCYTES NFR BLD AUTO: 7 % (ref 4–12)
NEUTROPHILS # BLD AUTO: 2.36 THOUSANDS/ÂΜL (ref 1.85–7.62)
NEUTS SEG NFR BLD AUTO: 51 % (ref 43–75)
NONHDLC SERPL-MCNC: 171 MG/DL
NRBC BLD AUTO-RTO: 0 /100 WBCS
PLATELET # BLD AUTO: 281 THOUSANDS/UL (ref 149–390)
PMV BLD AUTO: 9.1 FL (ref 8.9–12.7)
POTASSIUM SERPL-SCNC: 4.3 MMOL/L (ref 3.5–5.3)
RBC # BLD AUTO: 4.74 MILLION/UL (ref 3.81–5.12)
SODIUM SERPL-SCNC: 139 MMOL/L (ref 135–147)
TRIGL SERPL-MCNC: 111 MG/DL (ref ?–150)
TSH SERPL DL<=0.05 MIU/L-ACNC: 1.71 UIU/ML (ref 0.45–4.5)
WBC # BLD AUTO: 4.72 THOUSAND/UL (ref 4.31–10.16)

## 2025-05-09 PROCEDURE — 84445 ASSAY OF TSI GLOBULIN: CPT

## 2025-05-09 PROCEDURE — 84443 ASSAY THYROID STIM HORMONE: CPT

## 2025-05-09 PROCEDURE — 36415 COLL VENOUS BLD VENIPUNCTURE: CPT

## 2025-05-09 PROCEDURE — 80048 BASIC METABOLIC PNL TOTAL CA: CPT

## 2025-05-09 PROCEDURE — 80061 LIPID PANEL: CPT

## 2025-05-09 PROCEDURE — 85025 COMPLETE CBC W/AUTO DIFF WBC: CPT

## 2025-05-12 LAB — TSI SER-ACNC: <0.1 IU/L (ref 0–0.55)

## 2025-05-13 ENCOUNTER — HOSPITAL ENCOUNTER (OUTPATIENT)
Dept: ULTRASOUND IMAGING | Facility: HOSPITAL | Age: 50
Discharge: HOME | End: 2025-05-13
Attending: INTERNAL MEDICINE
Payer: COMMERCIAL

## 2025-05-13 ENCOUNTER — HOSPITAL ENCOUNTER (OUTPATIENT)
Dept: MAMMOGRAPHY | Facility: HOSPITAL | Age: 50
Discharge: HOME | End: 2025-05-13
Attending: INTERNAL MEDICINE
Payer: COMMERCIAL

## 2025-05-13 VITALS — WEIGHT: 165 LBS | HEIGHT: 60 IN | BODY MASS INDEX: 32.39 KG/M2

## 2025-05-13 DIAGNOSIS — Z00.00 HEALTHCARE MAINTENANCE: ICD-10-CM

## 2025-05-13 DIAGNOSIS — Z12.31 ENCOUNTER FOR SCREENING MAMMOGRAM FOR BREAST CANCER: ICD-10-CM

## 2025-05-13 PROCEDURE — 77063 BREAST TOMOSYNTHESIS BI: CPT

## 2025-05-13 PROCEDURE — 76536 US EXAM OF HEAD AND NECK: CPT

## 2025-05-13 PROCEDURE — 77067 SCR MAMMO BI INCL CAD: CPT

## 2025-06-05 PROBLEM — Z00.00 HEALTHCARE MAINTENANCE: Status: RESOLVED | Noted: 2022-10-31 | Resolved: 2025-06-05

## 2025-06-16 ENCOUNTER — OFFICE VISIT (OUTPATIENT)
Dept: ENDOCRINOLOGY | Facility: CLINIC | Age: 50
End: 2025-06-16
Payer: COMMERCIAL

## 2025-06-16 VITALS
OXYGEN SATURATION: 99 % | SYSTOLIC BLOOD PRESSURE: 122 MMHG | WEIGHT: 166 LBS | BODY MASS INDEX: 32.59 KG/M2 | HEART RATE: 71 BPM | HEIGHT: 60 IN | DIASTOLIC BLOOD PRESSURE: 80 MMHG

## 2025-06-16 DIAGNOSIS — E05.90 HYPERTHYROIDISM: Primary | ICD-10-CM

## 2025-06-16 DIAGNOSIS — R76.8 THYROID ANTIBODY POSITIVE: ICD-10-CM

## 2025-06-16 DIAGNOSIS — E04.1 THYROID NODULE: ICD-10-CM

## 2025-06-16 DIAGNOSIS — E55.9 VITAMIN D DEFICIENCY: ICD-10-CM

## 2025-06-16 PROCEDURE — 99214 OFFICE O/P EST MOD 30 MIN: CPT | Performed by: PHYSICIAN ASSISTANT

## 2025-06-16 NOTE — PROGRESS NOTES
Name: Shavonne Campos      : 1975      MRN: [id number]  Encounter Provider: Maryan Perea PA-C  Encounter Date: 2025   Encounter department: [facility] [place]    CC: hx of abnormal thyroid labs  She is here with her daughter  Assessment & Plan  Hyperthyroidism  Resolved  Most recent TSH remains normal at 1.706  Not on thyroid medication  Due to history of antibodies check labs in 6 months and prior to next visit  Orders:    T4, free; Future    TSH, 3rd generation; Future    T4, free; Future    TSH, 3rd generation; Future    Thyroid antibody positive  Suggestive of Hashimoto's thyroiditis  Orders:    Thyroid Antibodies Panel; Future    Vitamin D deficiency  Vitamin D previously low at 28.9  Continue current supplementation  Check level  Orders:    Vitamin D 25 hydroxy; Future    Vitamin D 25 hydroxy; Future    Thyroid nodule  Ultrasound shows a heterogeneous gland.  Subcentimeter right lower pole nodule does not meet criteria for biopsy or follow-up ultrasound.           History of Present Illness     Shavonne Campos is a 50 y.o. female here for follow-up of hyperthyroidism and vitamin-D deficiency.  She has not been seen for follow-up in over 2 years.  In 2021 she was noted to have low TSH is 0.259 and free T4 1.12.  Thyroid antibodies are positive but thyroid-stimulating immunoglobulin and thyrotropin receptor antibody were previously negative.  Most recent TSH is normal at 1.706.  She is not on thyroid medication at this time. She does have a sister who has thyroid disorder. No history of external radiation to head/neck/chest. No family history of thyroid cancer. No recent Iodine loading in form of medication, biotin or kelp supplements or radiological diagnostic studies.     Thyroid ultrasound on May 2025  FINDINGS:  Thyroid texture: Thyroid parenchyma is diffusely heterogeneous in echotexture and also mildly hyperemic.     Right lobe: 6.1 x 1.5 x 2.0  cm. Volume 8.7 mL  Left lobe: 6.0 x 1.2 x 1.8 cm. Volume 6.2 mL  Isthmus: 0.5 cm.     Nodule #1. Images 14 and 16.  RIGHT lower pole nodule measuring 0.7 x 0.5 x 0.6 cm. This nodule was not measured on the prior study, though was likely present and difficult to definitively characterize due to heterogeneous echotexture.  COMPOSITION: 2 points, solid or almost completely solid.  ECHOGENICITY: 1 point, hyperechoic or isoechoic.  SHAPE: 0 points, wider-than-tall.  MARGIN: 0 points, ill-defined.  ECHOGENIC FOCI: 0 points, none or large comet-tail artifacts.  TI-RADS Classification: TR 3 (3 points). FNA if >/= 2.5 cm. Follow if >/= 1.5 cm.     IMPRESSION:     In background of diffusely heterogeneous thyroid parenchyma, small right lower pole thyroid nodule which does not meet current ACR criteria for requiring biopsy nor follow-up.      Vitamin D deficiency: Vitamin-D previously low at 28.9. She has is taking vitamin D3 3000 International Units.    Review of Systems   Constitutional:  Positive for fatigue. Negative for activity change, appetite change and unexpected weight change.   HENT:  Positive for trouble swallowing (occasional).    Eyes:  Negative for visual disturbance.   Respiratory:  Negative for shortness of breath.    Cardiovascular:  Positive for palpitations. Negative for chest pain.   Gastrointestinal:  Negative for constipation and diarrhea.   Endocrine: Positive for cold intolerance. Negative for heat intolerance.   Musculoskeletal:  Positive for arthralgias.   Skin: Negative.    Neurological:  Positive for headaches. Negative for tremors.   Psychiatric/Behavioral:  The patient is nervous/anxious.     as per HPI  Medications Ordered Prior to Encounter[1]      Medical History Reviewed by provider this encounter:  Tobacco  Allergies  Meds  Problems  Med Hx  Surg Hx  Fam Hx     .    Objective   /80   Pulse 71   Ht 5' (1.524 m)   Wt 75.3 kg (166 lb)   SpO2 99%   BMI 32.42 kg/m²      Body  "mass index is 32.42 kg/m².  Wt Readings from Last 3 Encounters:   06/16/25 75.3 kg (166 lb)   05/13/25 74.8 kg (165 lb)   05/06/25 74.8 kg (165 lb)     Physical Exam  Vitals and nursing note reviewed.   Constitutional:       Appearance: She is well-developed.   HENT:      Head: Normocephalic.     Eyes:      General: No scleral icterus.    Neck:      Thyroid: No thyromegaly.     Cardiovascular:      Rate and Rhythm: Normal rate and regular rhythm.      Pulses:           Radial pulses are 2+ on the right side and 2+ on the left side.      Heart sounds: No murmur heard.  Pulmonary:      Effort: Pulmonary effort is normal. No respiratory distress.      Breath sounds: Normal breath sounds. No wheezing.     Musculoskeletal:      Cervical back: Neck supple.     Skin:     General: Skin is warm and dry.     Neurological:      Mental Status: She is alert.         Labs: I have reviewed pertinent labs including: No results found for: \"HGBA1C\"   Lab Results   Component Value Date    CREATININE 0.65 05/09/2025    CREATININE 0.67 04/09/2025    CREATININE 0.63 11/11/2024    BUN 19 05/09/2025    K 4.3 05/09/2025     05/09/2025    CO2 31 05/09/2025      eGFR   Date Value Ref Range Status   05/09/2025 102 ml/min/1.73sq m Final      Radiology Results Review: I have reviewed radiology reports from 2025 including: Ultrasound(s).  Patient Instructions   Check vitamin D level now  Check thyroid labs in 6 months and repeat labs in 1 year prior to visit    Discussed with the patient and all questioned fully answered. She will call me if any problems arise.    Administrative Statements   I have spent a total time of 30 minutes in caring for this patient on the day of the visit/encounter including Importance of tx compliance, Documenting in the medical record, Reviewing/placing orders in the medical record (including tests, medications, and/or procedures), and Obtaining or reviewing history  .         [1]   Current Outpatient Medications " on File Prior to Visit   Medication Sig Dispense Refill    omeprazole (PriLOSEC) 40 MG capsule TAKE 1 CAPSULE TWICE A  capsule 3    [DISCONTINUED] Cholecalciferol (Vitamin D3) 50 MCG (2000 UT) capsule Take 1.5 capsules (3,000 Units total) by mouth daily (Patient not taking: Reported on 1/22/2024)      [DISCONTINUED] lisinopril (ZESTRIL) 40 mg tablet Take 1 tablet (40 mg total) by mouth daily 20 tablet 0    [DISCONTINUED] meclizine (ANTIVERT) 25 mg tablet Take 1 tablet (25 mg total) by mouth 3 (three) times a day as needed for dizziness 30 tablet 0     No current facility-administered medications on file prior to visit.

## 2025-06-16 NOTE — ASSESSMENT & PLAN NOTE
Ultrasound shows a heterogeneous gland.  Subcentimeter right lower pole nodule does not meet criteria for biopsy or follow-up ultrasound.

## 2025-06-16 NOTE — PATIENT INSTRUCTIONS
Check vitamin D level now  Check thyroid labs in 6 months and repeat labs in 1 year prior to visit

## 2025-06-16 NOTE — ASSESSMENT & PLAN NOTE
Vitamin D previously low at 28.9  Continue current supplementation  Check level  Orders:    Vitamin D 25 hydroxy; Future    Vitamin D 25 hydroxy; Future

## 2025-06-16 NOTE — ASSESSMENT & PLAN NOTE
Resolved  Most recent TSH remains normal at 1.706  Not on thyroid medication  Due to history of antibodies check labs in 6 months and prior to next visit  Orders:    T4, free; Future    TSH, 3rd generation; Future    T4, free; Future    TSH, 3rd generation; Future

## 2025-06-20 ENCOUNTER — OFFICE VISIT (OUTPATIENT)
Age: 50
End: 2025-06-20
Payer: COMMERCIAL

## 2025-06-20 VITALS
WEIGHT: 168 LBS | HEART RATE: 68 BPM | OXYGEN SATURATION: 99 % | BODY MASS INDEX: 32.81 KG/M2 | DIASTOLIC BLOOD PRESSURE: 80 MMHG | SYSTOLIC BLOOD PRESSURE: 128 MMHG | TEMPERATURE: 98.1 F

## 2025-06-20 DIAGNOSIS — E05.90 HYPERTHYROIDISM: ICD-10-CM

## 2025-06-20 DIAGNOSIS — I10 HYPERTENSION, UNSPECIFIED TYPE: ICD-10-CM

## 2025-06-20 DIAGNOSIS — K21.9 GASTROESOPHAGEAL REFLUX DISEASE WITHOUT ESOPHAGITIS: ICD-10-CM

## 2025-06-20 DIAGNOSIS — E78.01 FAMILIAL HYPERCHOLESTEROLEMIA: ICD-10-CM

## 2025-06-20 DIAGNOSIS — R42 VERTIGO: Primary | ICD-10-CM

## 2025-06-20 DIAGNOSIS — E04.1 THYROID NODULE: ICD-10-CM

## 2025-06-20 DIAGNOSIS — Z00.00 HEALTHCARE MAINTENANCE: ICD-10-CM

## 2025-06-20 PROCEDURE — 99396 PREV VISIT EST AGE 40-64: CPT | Performed by: INTERNAL MEDICINE

## 2025-06-20 NOTE — ASSESSMENT & PLAN NOTE
Patient has apparently over the years chronic vertiginous symptoms.  During exam this could be elicited with the rapid movements of her head.  Does have nystagmus with gaze.  No other cerebellar abnormalities on exam.  Patient has very slight difficulty with tandem walking.  We feel very highly the patient could benefit from physical therapy program and consult has been sent.  Return to the office in 4 weeks for reevaluation    Orders:    Ambulatory Referral to Physical Therapy; Future

## 2025-06-20 NOTE — ASSESSMENT & PLAN NOTE
In looking at recent studies patient's thyroid function is normal.  She does and has been evaluated by endocrinology.  They will continue to monitor her thyroid function.  Further treatment is indicated.

## 2025-06-20 NOTE — PROGRESS NOTES
Name: Shavonne Campos      : 1975      MRN: [id number]  Encounter Provider: Jacob Butt DO  Encounter Date: 2025   Encounter department: [facility]    Assessment & Plan  Hypertension, unspecified type  Patient has a diagnosis of hypertension.  As noted patient's blood pressure is showing excellent control without medication.  Renal function is normal.  We will continue to follow-up and initiate medication in the future if needed    Orders:  •  Ambulatory Referral to Physical Therapy; Future    Vertigo  Patient has apparently over the years chronic vertiginous symptoms.  During exam this could be elicited with the rapid movements of her head.  Does have nystagmus with gaze.  No other cerebellar abnormalities on exam.  Patient has very slight difficulty with tandem walking.  We feel very highly the patient could benefit from physical therapy program and consult has been sent.  Return to the office in 4 weeks for reevaluation    Orders:  •  Ambulatory Referral to Physical Therapy; Future    Healthcare maintenance  Patient is a 50-year-old [place] woman who is here today for a routine physical after having extensive lab testing performed.  She is accompanied by family member who is helping with translation.  Patient states in general doing relatively well but she did have an episode when getting on and off the scale today of feeling dizzy and apparently she has vertiginous symptoms over a number of years but has never sought medical advice or treatment for this.  We did review all the patient's labs and we discussed the results with no major abnormalities.  Patient did undergo physical exam today with results as noted.  With her vertiginous symptoms we made a decision today that the patient may benefit from a physical therapy appointment evaluation and the order has been written.  Will return to the office in approximately 4 weeks for reevaluation          Hyperthyroidism  In looking at recent studies patient's thyroid function is normal.  She does and has been evaluated by endocrinology.  They will continue to monitor her thyroid function.  Further treatment is indicated.         Gastroesophageal reflux disease without esophagitis  No GI complaints or difficulties.  Weight is stable.  Instructed to call if any new GI problems or complaints.  Routine colon screening as indicated.         Thyroid nodule  Small thyroid nodule does not meet the criteria for biopsy         Familial hypercholesterolemia  With the patient cholesterol elevated patient would be a candidate for treatment.  Was told the importance of watching her diet and states would like to work on reducing fats and cholesterol from her diet prior to any treatment.  Repeat another lipid profile in 4 months.              History of Present Illness     50-year-old Bur[place] woman history of medical problems outlined previously who is here today for a routine physical.  She did have extensive lab testing performed prior to the visit like to discuss the results.  Since last seen patient has also been seen and initial evaluation by endocrinology with a history of thyroid disease.  Patient states she is doing well but apparently with initial examination today in the office did have severe vertiginous symptoms and with questioning this is a common occurrence with her especially with rapid head movement.  Review of Systems   Constitutional: Negative.    HENT: Negative.     Eyes: Negative.    Respiratory: Negative.     Cardiovascular: Negative.    Gastrointestinal: Negative.    Endocrine: Negative.    Genitourinary: Negative.    Musculoskeletal: Negative.    Skin: Negative.    Allergic/Immunologic: Negative.    Neurological:  Positive for dizziness. Negative for tremors, seizures, syncope, facial asymmetry, speech difficulty, weakness, light-headedness, numbness and headaches.   Hematological: Negative.     Psychiatric/Behavioral: Negative.     Past Medical History[1]  Past Surgical History[2]  Family History[3]  Social History[4]  Medications[5]  No Known Allergies  Immunization History   Administered Date(s) Administered   • COVID-19 PFIZER VACCINE 0.3 ML IM 04/23/2021, 05/15/2021   • COVID-19 Pfizer vac (Hamilton-sucrose, gray cap) 12 yr+ IM 03/09/2022     Objective   /80 (BP Location: Left arm, Patient Position: Sitting, Cuff Size: Standard)   Pulse 68   Temp 98.1 °F (36.7 °C) (Tympanic Core)   Wt 76.2 kg (168 lb)   SpO2 99%   PF 98 L/min   BMI 32.81 kg/m²     Physical Exam  Vitals and nursing note reviewed.   Constitutional:       General: She is not in acute distress.     Appearance: Normal appearance. She is obese. She is not ill-appearing, toxic-appearing or diaphoretic.      Comments: Pleasant, obese 50-year-old female who is awake alert accompanied by family member who acts as .  Patient has no acute distress and seems to be oriented x 3   HENT:      Head: Normocephalic and atraumatic.      Right Ear: Tympanic membrane, ear canal and external ear normal. There is no impacted cerumen.      Left Ear: Tympanic membrane, ear canal and external ear normal. There is no impacted cerumen.      Nose: Nose normal. No congestion or rhinorrhea.      Mouth/Throat:      Mouth: Mucous membranes are moist.      Pharynx: Oropharynx is clear. No oropharyngeal exudate or posterior oropharyngeal erythema.      Comments: Thick tongue and some obstruction of oral airway    Eyes:      General: No scleral icterus.        Right eye: No discharge.         Left eye: No discharge.      Extraocular Movements: Extraocular movements intact.      Conjunctiva/sclera: Conjunctivae normal.      Pupils: Pupils are equal, round, and reactive to light.     Neck:      Vascular: No carotid bruit.      Comments: Thyromegaly both lobes, nontender to palpation  Cardiovascular:      Rate and Rhythm: Normal rate and regular rhythm.       Pulses: Normal pulses.      Heart sounds: Normal heart sounds. No murmur heard.     No friction rub. No gallop.   Pulmonary:      Effort: Pulmonary effort is normal. No respiratory distress.      Breath sounds: Normal breath sounds. No stridor. No wheezing, rhonchi or rales.   Chest:      Chest wall: No tenderness.   Abdominal:      General: Abdomen is flat. Bowel sounds are normal. There is no distension.      Palpations: Abdomen is soft. There is no mass.      Tenderness: There is no abdominal tenderness. There is no right CVA tenderness, left CVA tenderness, guarding or rebound.      Hernia: No hernia is present.     Musculoskeletal:         General: No swelling, tenderness, deformity or signs of injury. Normal range of motion.      Cervical back: Normal range of motion and neck supple. No rigidity or tenderness.      Right lower leg: No edema.      Left lower leg: No edema.   Lymphadenopathy:      Cervical: No cervical adenopathy.     Skin:     General: Skin is warm and dry.      Capillary Refill: Capillary refill takes less than 2 seconds.      Coloration: Skin is not jaundiced or pale.      Findings: No bruising, erythema, lesion or rash.     Neurological:      Mental Status: She is alert and oriented to person, place, and time. Mental status is at baseline.      Cranial Nerves: Cranial nerve deficit present.      Sensory: No sensory deficit.      Motor: No weakness.      Coordination: Coordination normal.      Gait: Gait normal.      Deep Tendon Reflexes: Reflexes normal.      Comments: Severe nystagmus with horizontal gaze to the left.  No other severe neurologic symptoms other than episodes of vertigo with head movement   Psychiatric:         Mood and Affect: Mood normal.         Behavior: Behavior normal.         Thought Content: Thought content normal.         Judgment: Judgment normal.            [1]  Past Medical History:  Diagnosis Date   • Disease of thyroid gland    • Migraine    [2]  Past  Surgical History:  Procedure Laterality Date   • CHOLECYSTECTOMY LAPAROSCOPIC N/A 12/16/2023    Procedure: CHOLECYSTECTOMY LAPAROSCOPIC;  Surgeon: Sang Goodwin MD;  Location: [facility];  Service: General   • GALLBLADDER SURGERY N/A 12/2023   [3]  Family History  Problem Relation Name Age of Onset   • Diabetes type II Mother     • Diabetes type II Father     • Heart disease Father     • Thyroid disease Sister     • Other Daughter     • Colon cancer Maternal Grandmother     • No Known Problems Maternal Grandfather     • No Known Problems Paternal Grandmother     • No Known Problems Paternal Grandfather     • No Known Problems Son     • No Known Problems Maternal Aunt     • No Known Problems Paternal Aunt     [4]  Social History  Tobacco Use   • Smoking status: Never   • Smokeless tobacco: Never   Vaping Use   • Vaping status: Never Used   Substance and Sexual Activity   • Alcohol use: Never   • Drug use: Never   • Sexual activity: Not Currently     Partners: Male   [5]  Current Outpatient Medications on File Prior to Visit   Medication Sig   • omeprazole (PriLOSEC) 40 MG capsule TAKE 1 CAPSULE TWICE A DAY

## 2025-06-20 NOTE — ASSESSMENT & PLAN NOTE
No GI complaints or difficulties.  Weight is stable.  Instructed to call if any new GI problems or complaints.  Routine colon screening as indicated.

## 2025-06-20 NOTE — ASSESSMENT & PLAN NOTE
With the patient cholesterol elevated patient would be a candidate for treatment.  Was told the importance of watching her diet and states would like to work on reducing fats and cholesterol from her diet prior to any treatment.  Repeat another lipid profile in 4 months.

## 2025-06-20 NOTE — ASSESSMENT & PLAN NOTE
Patient has a diagnosis of hypertension.  As noted patient's blood pressure is showing excellent control without medication.  Renal function is normal.  We will continue to follow-up and initiate medication in the future if needed    Orders:    Ambulatory Referral to Physical Therapy; Future

## 2025-06-20 NOTE — ASSESSMENT & PLAN NOTE
Patient is a 50-year-old Spanish woman who is here today for a routine physical after having extensive lab testing performed.  She is accompanied by family member who is helping with translation.  Patient states in general doing relatively well but she did have an episode when getting on and off the scale today of feeling dizzy and apparently she has vertiginous symptoms over a number of years but has never sought medical advice or treatment for this.  We did review all the patient's labs and we discussed the results with no major abnormalities.  Patient did undergo physical exam today with results as noted.  With her vertiginous symptoms we made a decision today that the patient may benefit from a physical therapy appointment evaluation and the order has been written.  Will return to the office in approximately 4 weeks for reevaluation

## (undated) DEVICE — NEEDLE 23G X 1 1/2 SAFETY-GLIDE THIN WALL

## (undated) DEVICE — TROCAR APPPLE 5MM EXTENDED LENGTH

## (undated) DEVICE — LIGAMAX 5 MM ENDOSCOPIC MULTIPLE CLIP APPLIER: Brand: LIGAMAX

## (undated) DEVICE — STERILE SURGICAL LUBRICANT,  TUBE: Brand: SURGILUBE

## (undated) DEVICE — GLOVE SRG BIOGEL ECLIPSE 7

## (undated) DEVICE — INSUFLATION TUBING INSUFLOW (LEXION)

## (undated) DEVICE — ADHESIVE SKIN HIGH VISCOSITY EXOFIN 1ML

## (undated) DEVICE — DECANTER: Brand: UNBRANDED

## (undated) DEVICE — TROCAR: Brand: KII FIOS FIRST ENTRY

## (undated) DEVICE — UNDYED BRAIDED (POLYGLACTIN 910), SYNTHETIC ABSORBABLE SUTURE: Brand: COATED VICRYL

## (undated) DEVICE — LAPAROSCOPIC SMOKE EVAC TUBING

## (undated) DEVICE — ELECTRODE LAP J HOOK SPLIT STEM E-Z CLEAN 33CM -0021S

## (undated) DEVICE — INTENDED FOR TISSUE SEPARATION, AND OTHER PROCEDURES THAT REQUIRE A SHARP SURGICAL BLADE TO PUNCTURE OR CUT.: Brand: BARD-PARKER SAFETY BLADES SIZE 11, STERILE

## (undated) DEVICE — TISSUE RETRIEVAL SYSTEM: Brand: INZII RETRIEVAL SYSTEM

## (undated) DEVICE — SUT MONOCRYL 4-0 PS-2 27 IN Y426H

## (undated) DEVICE — PACK PBDS LAP CHOLE RF

## (undated) DEVICE — TOWEL SURG XR DETECT GREEN STRL RFD